# Patient Record
Sex: MALE | Race: WHITE | NOT HISPANIC OR LATINO | Employment: UNEMPLOYED | URBAN - METROPOLITAN AREA
[De-identification: names, ages, dates, MRNs, and addresses within clinical notes are randomized per-mention and may not be internally consistent; named-entity substitution may affect disease eponyms.]

---

## 2018-06-07 ENCOUNTER — OFFICE VISIT (OUTPATIENT)
Dept: FAMILY MEDICINE CLINIC | Facility: CLINIC | Age: 58
End: 2018-06-07
Payer: COMMERCIAL

## 2018-06-07 VITALS
DIASTOLIC BLOOD PRESSURE: 58 MMHG | WEIGHT: 150.6 LBS | HEART RATE: 59 BPM | SYSTOLIC BLOOD PRESSURE: 104 MMHG | TEMPERATURE: 96.7 F | BODY MASS INDEX: 22.31 KG/M2 | RESPIRATION RATE: 16 BRPM | HEIGHT: 69 IN | OXYGEN SATURATION: 98 %

## 2018-06-07 DIAGNOSIS — Z13.1 SCREENING FOR DIABETES MELLITUS: ICD-10-CM

## 2018-06-07 DIAGNOSIS — R53.83 FATIGUE, UNSPECIFIED TYPE: ICD-10-CM

## 2018-06-07 DIAGNOSIS — Z12.12 SCREENING FOR COLORECTAL CANCER: ICD-10-CM

## 2018-06-07 DIAGNOSIS — Z12.5 SCREENING FOR PROSTATE CANCER: ICD-10-CM

## 2018-06-07 DIAGNOSIS — Z00.00 HEALTHCARE MAINTENANCE: Primary | ICD-10-CM

## 2018-06-07 DIAGNOSIS — Z23 NEED FOR TDAP VACCINATION: ICD-10-CM

## 2018-06-07 DIAGNOSIS — Z12.11 SCREENING FOR COLORECTAL CANCER: ICD-10-CM

## 2018-06-07 PROCEDURE — 90715 TDAP VACCINE 7 YRS/> IM: CPT | Performed by: FAMILY MEDICINE

## 2018-06-07 PROCEDURE — 99396 PREV VISIT EST AGE 40-64: CPT | Performed by: FAMILY MEDICINE

## 2018-06-07 PROCEDURE — 90471 IMMUNIZATION ADMIN: CPT | Performed by: FAMILY MEDICINE

## 2018-06-07 RX ORDER — CHOLECALCIFEROL (VITAMIN D3) 50 MCG
TABLET ORAL
COMMUNITY

## 2018-06-07 RX ORDER — MULTIVITAMIN WITH IRON
TABLET ORAL
COMMUNITY
End: 2019-10-22 | Stop reason: ALTCHOICE

## 2018-06-08 NOTE — PROGRESS NOTES
Assessment/Plan     Healthy male exam       1    Anxiety:  Referred patient and provided contact information to [facility], [address] [facility]  2  Insomnia:  Patient states that he has taken melatonin the past without resolution of symptoms  Patient states he is taking a herbal supplement  He states that he ordered it over the phone and received a couple months supply  He states that it does not help  He showed me the pamphlet and it is basically a multivitamin with different herbs and supplements  Advised patient that the supplements have no oversight by the FDA and I am unable to say exactly what is contained within the pill and how accurate the supplements claims and levels of vitamins and herbs are actually contained within the pill  I also cannot tell the patient that there are no harmful substances contaminants within the pill as there is no FDA oversight  Patient declines taking any medication  Advised sleep hygiene including no activity 1 hr prior to bed, sleep in a dark on that area, reduce noise, can use white noise machine if patient feels that it helps  Urinate prior to going to bed  3  Patient has family h/o hemochromatosis and would like his blood iron levels checked  Will order iron panel, CBC with platelets  4   Patient Counselin  Nutrition: Stressed importance of moderation in sodium/caffeine intake, saturated fat and cholesterol, caloric balance, sufficient intake of fresh fruits, vegetables, fiber  2  Discussed the issue of estrogen replacement, calcium supplement, and the daily use of baby aspirin  3  Exercise: Stressed the importance of regular exercise  4  Substance Abuse: Discussed cessation/primary prevention of tobacco, alcohol, or other drug use; driving or other dangerous activities under the influence; availability of treatment for abuse    5  Sexuality: Discussed sexually transmitted diseases, partner selection, use of condoms, avoidance of unintended pregnancy  and contraceptive alternatives  6  Injury prevention: Discussed safety belts, safety helmets, smoke detector, smoking near bedding or upholstery  7  Dental health: Discussed importance of regular tooth brushing, flossing, and dental visits  8  Immunizations reviewed  Patient receive a tetanus booster  9  Routine screening blood work  His will order lipid panel, BMP, hemoglobin A1c to screen for diabetes, PSA for screening for prostate cancer  10  Discussed benefits of screening colonoscopy  Gastroenterology referral provided to patient with contact information  11  After hours service discussed with patient    3  Discussed the patient's BMI with him  BMI 22 24 The BMI is in the acceptable range  4  Follow up in one year  Sooner if patient has acute issue    Subjective     Tracey Veliz is a 66 y o  male and is here for a comprehensive physical exam  The patient reports that he is having trouble sleeping  Patient states that he can fall asleep but finds that he wakes up during the night multiple times  He does not have a reason for why he wakes up  Patient is unable to fall back asleep  Patient denies snoring, need to urinate during the night  Patient has tried melatonin the Passy feels that it does not work  Patient does take a mail order supplement from [employer] with multiple vitamins and herbs contained within it  Patient states that it does not help  Patient is extremely anxious  He does have trouble with anxiety and would like someone to talk to  Patient denies suicidal ideation or plan  Do you take any herbs or supplements that were not prescribed by a doctor? yes  Are you taking calcium supplements?  no  Are you taking aspirin daily? no        The following portions of the patient's history were reviewed and updated as appropriate: allergies, current medications, past family history, past medical history, past social history, past surgical history and problem list     Review of Systems  Do you have pain that bothers you in your daily life? no  A comprehensive review of systems was negative except for: As listed in HPI  Objective     /58   Pulse 59   Temp (!) 96 7 °F (35 9 °C)   Resp 16   Ht 5' 9" (1 753 m)   Wt 68 3 kg (150 lb 9 6 oz)   SpO2 98%   BMI 22 24 kg/m²     General Appearance:    Alert, cooperative, no distress, appears stated age   Head:    Normocephalic, without obvious abnormality, atraumatic   Eyes:    PERRL, conjunctiva/corneas clear, EOM's intact, fundi     benign, both eyes        Ears:    Normal TM's and external ear canals, both ears   Nose:   Nares normal, septum midline, mucosa normal, no drainage    or sinus tenderness   Throat:   Lips, mucosa, and tongue normal; teeth and gums normal   Neck:   Supple, symmetrical, trachea midline, no adenopathy;        thyroid:  No enlargement/tenderness/nodules; no carotid    bruit or JVD   Back:     Symmetric, no curvature, ROM normal, no CVA tenderness   Lungs:     Clear to auscultation bilaterally, respirations unlabored   Chest wall:    No tenderness or deformity   Heart:    Regular rate and rhythm, S1 and S2 normal, no murmur, rub   or gallop   Abdomen:     Soft, non-tender, bowel sounds active all four quadrants,     no masses, no organomegaly   Genitalia:    Normal male without lesion, discharge or tenderness   Rectal:    Deferred will order PSA   Extremities: Extremities normal, atraumatic, no cyanosis or edema   Pulses:   2+ and symmetric all extremities   Skin:   Skin color, texture, turgor normal, no rashes or lesions   Lymph nodes:   Cervical, supraclavicular, and axillary nodes normal   Neurologic:   CNII-XII intact  Normal strength, sensation and reflexes       throughout

## 2018-06-11 LAB
BUN SERPL-MCNC: 19 MG/DL (ref 6–24)
BUN/CREAT SERPL: 19 (ref 9–20)
CALCIUM SERPL-MCNC: 9.2 MG/DL (ref 8.7–10.2)
CHLORIDE SERPL-SCNC: 101 MMOL/L (ref 96–106)
CO2 SERPL-SCNC: 25 MMOL/L (ref 20–29)
CREAT SERPL-MCNC: 1.02 MG/DL (ref 0.76–1.27)
GLUCOSE SERPL-MCNC: 97 MG/DL (ref 65–99)
POTASSIUM SERPL-SCNC: 4.7 MMOL/L (ref 3.5–5.2)
SL AMB EGFR AFRICAN AMERICAN: 95 ML/MIN/1.73
SL AMB EGFR NON AFRICAN AMERICAN: 82 ML/MIN/1.73
SODIUM SERPL-SCNC: 142 MMOL/L (ref 134–144)

## 2018-06-12 LAB
CHOLEST SERPL-MCNC: 231 MG/DL (ref 100–199)
CHOLEST/HDLC SERPL: 3.7 RATIO (ref 0–5)
ERYTHROCYTE [DISTWIDTH] IN BLOOD BY AUTOMATED COUNT: 13 % (ref 12.3–15.4)
EST. AVERAGE GLUCOSE BLD GHB EST-MCNC: 108 MG/DL
HBA1C MFR BLD: 5.4 % (ref 4.8–5.6)
HCT VFR BLD AUTO: 47.3 % (ref 37.5–51)
HDLC SERPL-MCNC: 62 MG/DL
HGB BLD-MCNC: 15.7 G/DL (ref 13–17.7)
LDLC SERPL CALC-MCNC: 144 MG/DL (ref 0–99)
MAGNESIUM SERPL-MCNC: 2.1 MG/DL (ref 1.6–2.3)
MCH RBC QN AUTO: 29.8 PG (ref 26.6–33)
MCHC RBC AUTO-ENTMCNC: 33.2 G/DL (ref 31.5–35.7)
MCV RBC AUTO: 90 FL (ref 79–97)
PLATELET # BLD AUTO: 221 X10E3/UL (ref 150–379)
PSA FREE MFR SERPL: 25 %
PSA FREE SERPL-MCNC: 0.3 NG/ML
PSA SERPL-MCNC: 1.2 NG/ML (ref 0–4)
RBC # BLD AUTO: 5.27 X10E6/UL (ref 4.14–5.8)
SL AMB VLDL CHOLESTEROL CALC: 25 MG/DL (ref 5–40)
TRIGL SERPL-MCNC: 123 MG/DL (ref 0–149)
WBC # BLD AUTO: 5.6 X10E3/UL (ref 3.4–10.8)

## 2019-01-14 ENCOUNTER — OFFICE VISIT (OUTPATIENT)
Dept: PODIATRY | Facility: CLINIC | Age: 59
End: 2019-01-14
Payer: COMMERCIAL

## 2019-01-14 VITALS
BODY MASS INDEX: 22.22 KG/M2 | HEART RATE: 69 BPM | RESPIRATION RATE: 16 BRPM | DIASTOLIC BLOOD PRESSURE: 83 MMHG | HEIGHT: 69 IN | SYSTOLIC BLOOD PRESSURE: 125 MMHG | WEIGHT: 150 LBS

## 2019-01-14 DIAGNOSIS — Q66.52 CONGENITAL PES PLANUS OF LEFT FOOT: ICD-10-CM

## 2019-01-14 DIAGNOSIS — M21.969 ACQUIRED DEFORMITY OF FOOT, UNSPECIFIED LATERALITY: ICD-10-CM

## 2019-01-14 DIAGNOSIS — M77.42 METATARSALGIA OF BOTH FEET: Primary | ICD-10-CM

## 2019-01-14 DIAGNOSIS — M77.41 METATARSALGIA OF BOTH FEET: Primary | ICD-10-CM

## 2019-01-14 DIAGNOSIS — Q66.51 CONGENITAL PES PLANUS OF RIGHT FOOT: ICD-10-CM

## 2019-01-14 DIAGNOSIS — M79.672 PAIN IN BOTH FEET: ICD-10-CM

## 2019-01-14 DIAGNOSIS — M79.671 PAIN IN BOTH FEET: ICD-10-CM

## 2019-01-14 PROCEDURE — L3000 FT INSERT UCB BERKELEY SHELL: HCPCS | Performed by: PODIATRIST

## 2019-01-14 PROCEDURE — 99243 OFF/OP CNSLTJ NEW/EST LOW 30: CPT | Performed by: PODIATRIST

## 2019-01-14 RX ORDER — NICOTINE POLACRILEX 2 MG
GUM BUCCAL
COMMUNITY
End: 2019-10-22 | Stop reason: ALTCHOICE

## 2019-01-14 NOTE — PROGRESS NOTES
Assessment/Plan:  Metatarsalgia  Rule out neuroma  Pes planus bilateral   Arthrosis  Plan  Foot exam performed  Patient advised on condition  He declines injection therapy  He declines oral anti-inflammatory medicine  His feet have been casted for custom molded foot orthotics  He may need physical therapy    No problem-specific Assessment & Plan notes found for this encounter  There are no diagnoses linked to this encounter  Subjective:  Patient has a burning pain in the ball of the right foot  He believes it to be inflamed nerve  It has been there for years  No history of occult trauma    No past medical history on file  Past Surgical History:   Procedure Laterality Date    NO PAST SURGERIES         No Known Allergies      Current Outpatient Prescriptions:     Biotin 1 MG CAPS, Take by mouth, Disp: , Rfl:     cholecalciferol (VITAMIN D3) 1,000 units tablet, Take by mouth, Disp: , Rfl:     DiphenhydrAMINE HCl, Sleep, (RESTFULLY SLEEP PO), Take by mouth, Disp: , Rfl:     Magnesium 250 MG TABS, Take by mouth, Disp: , Rfl:     Omega-3 Fatty Acids (FISH OIL) 645 MG CAPS, Take by mouth, Disp: , Rfl:     Patient Active Problem List   Diagnosis    Anxiety    Tremor    Head trauma          Patient ID: May Gray is a 66 y o  male  HPI    The following portions of the patient's history were reviewed and updated as appropriate: allergies, current medications, past family history, past medical history, past social history, past surgical history and problem list     Review of Systems      Objective:  Patient's shoes and socks removed     Foot Exam    General  General Appearance: appears stated age and healthy   Orientation: alert and oriented to person, place, and time   Affect: appropriate   Gait: antalgic       Right Foot/Ankle     Inspection and Palpation  Ecchymosis: none  Tenderness: lesser metatarsophalangeal joints and metatarsals   Swelling: metatarsals   Arch: pes planus  Hammertoes: fifth toe  Hallux valgus: no  Hallux limitus: yes    Neurovascular  Dorsalis pedis: 2+  Posterior tibial: 3+  Superficial peroneal nerve sensation: diminished  Deep peroneal nerve sensation: diminished  Achilles reflex: 1+      Left Foot/Ankle      Inspection and Palpation  Ecchymosis: none  Tenderness: none   Swelling: none   Arch: pes planus  Hammertoes: fifth toe  Hallux valgus: no  Hallux limitus: yes    Neurovascular  Dorsalis pedis: 2+  Posterior tibial: 3+  Superficial peroneal nerve sensation: diminished  Deep peroneal nerve sensation: diminished  Achilles reflex: 1+        Physical Exam   Constitutional: He appears well-developed and well-nourished  Cardiovascular: Normal rate and regular rhythm  Pulses:       Dorsalis pedis pulses are 2+ on the right side, and 2+ on the left side  Posterior tibial pulses are 3+ on the right side, and 3+ on the left side  Neurological:   Reflex Scores:       Achilles reflexes are 1+ on the right side and 1+ on the left side  Positive Vicente sign 2nd right intermetatarsal space   Nursing note and vitals reviewed

## 2019-10-22 ENCOUNTER — OFFICE VISIT (OUTPATIENT)
Dept: FAMILY MEDICINE CLINIC | Facility: CLINIC | Age: 59
End: 2019-10-22
Payer: COMMERCIAL

## 2019-10-22 VITALS
TEMPERATURE: 97.5 F | WEIGHT: 142 LBS | HEART RATE: 70 BPM | SYSTOLIC BLOOD PRESSURE: 118 MMHG | HEIGHT: 68 IN | DIASTOLIC BLOOD PRESSURE: 80 MMHG | BODY MASS INDEX: 21.52 KG/M2 | RESPIRATION RATE: 12 BRPM | OXYGEN SATURATION: 94 %

## 2019-10-22 DIAGNOSIS — R53.83 FATIGUE, UNSPECIFIED TYPE: ICD-10-CM

## 2019-10-22 DIAGNOSIS — Z12.5 SCREENING FOR MALIGNANT NEOPLASM OF PROSTATE: ICD-10-CM

## 2019-10-22 DIAGNOSIS — Z11.59 NEED FOR HEPATITIS C SCREENING TEST: ICD-10-CM

## 2019-10-22 DIAGNOSIS — E78.2 MIXED HYPERLIPIDEMIA: ICD-10-CM

## 2019-10-22 DIAGNOSIS — Z12.11 SCREENING FOR MALIGNANT NEOPLASM OF COLON: ICD-10-CM

## 2019-10-22 DIAGNOSIS — Z23 NEED FOR IMMUNIZATION AGAINST INFLUENZA: ICD-10-CM

## 2019-10-22 DIAGNOSIS — Z83.3 FAMILY HISTORY OF DIABETES MELLITUS (DM): ICD-10-CM

## 2019-10-22 DIAGNOSIS — Z76.89 ENCOUNTER TO ESTABLISH CARE: ICD-10-CM

## 2019-10-22 DIAGNOSIS — E55.9 VITAMIN D DEFICIENCY: ICD-10-CM

## 2019-10-22 DIAGNOSIS — F41.9 ANXIETY: Primary | ICD-10-CM

## 2019-10-22 DIAGNOSIS — Z86.69 HISTORY OF EPILEPSY: ICD-10-CM

## 2019-10-22 DIAGNOSIS — G47.00 INSOMNIA, UNSPECIFIED TYPE: ICD-10-CM

## 2019-10-22 PROCEDURE — 90471 IMMUNIZATION ADMIN: CPT

## 2019-10-22 PROCEDURE — 99214 OFFICE O/P EST MOD 30 MIN: CPT | Performed by: NURSE PRACTITIONER

## 2019-10-22 PROCEDURE — 90682 RIV4 VACC RECOMBINANT DNA IM: CPT

## 2019-10-22 PROCEDURE — 36415 COLL VENOUS BLD VENIPUNCTURE: CPT | Performed by: NURSE PRACTITIONER

## 2019-10-22 NOTE — PATIENT INSTRUCTIONS
Continue with current medications  No change to current treatment plan  Labwork as ordered  Cologuard for colon cancer screening  Schedule appt with neurology as discussed  Follow up as discussed or return to office earlier if you have any new issues or concerns

## 2019-10-22 NOTE — PROGRESS NOTES
Assessment/Plan:  1  Anxiety  Stress management  Activities to divert attention when possible  Conscious breathing techniques as discussed  Coping mechanisms and strategies vary from person to person so try to utilize strategies that you think may work for you (such as meditation, music, etc  )  Consider counseling as discussed  - CBC and differential  - Comprehensive metabolic panel  - TSH, 3rd generation  2  History of epilepsy  - CBC and differential  - Comprehensive metabolic panel  - Ambulatory referral to Neurology; Future  3  Vitamin D deficiency  - CBC and differential  - Comprehensive metabolic panel  - Vitamin D 25 hydroxy  4  Mixed hyperlipidemia  - CBC and differential  - Comprehensive metabolic panel  - Lipid panel  5  Encounter to establish care  Health maintenance discussed  Diet, exercise, weight management, stress management, etc    All questions addressed, answered, and pt verbalized understanding  Anticipatory guidance  - CBC and differential  - Comprehensive metabolic panel  - Hemoglobin A1C  - TSH, 3rd generation  6  Screening for malignant neoplasm of prostate  - PSA Total, Diagnostic  7  Screening for malignant neoplasm of colon  - Cologuard; Future  8  Insomnia, unspecified type  Sleep hygiene reviewed  Consider increased dose of Melatonin  Sleep time tea/herbal tea  Will check labs  - CBC and differential  - Comprehensive metabolic panel  - TSH, 3rd generation  9  Fatigue, unspecified type  - CBC and differential  - Comprehensive metabolic panel  - TSH, 3rd generation  10  Family history of diabetes mellitus (DM)  - CBC and differential  - Comprehensive metabolic panel  - Hemoglobin A1C  11  Need for immunization against influenza  - influenza vaccine, 7922-7052, quadrivalent, recombinant, PF, 0 5 mL, for patients 18 yr+ (FLUBLOK)  12   Need for hepatitis C screening test  - Hepatitis C antibody    Depression Screening Follow-up Plan: Patient's depression screening was positive with a PHQ-2 score of 2  Their PHQ-9 score was 12  Patient assessed for underlying major depression  They have no active suicidal ideations  Brief counseling provided and recommend additional follow-up/re-evaluation next office visit  BMI Counseling: Body mass index is 21 75 kg/m²  The BMI wnl  Counseled on balanced diet and regular exercise    Subjective:      Patient ID: Ernestine Mckeon is a 64 y o  male who presents to establish care    Here to establish care  PMH reviewed  Only med vitamin D  Used to take meds for chol, used to take fish oil   History of epilepsy since childhood  Had surgery and was on seizure meds from [id number]  No meds since   Seizure free for about 10 years  Did not follow up with neurology  Unsure if he should have "brain scan"  PSH ; only surgery for epilepsy  SH: non smoker, no etoh, used to work in garage  Not currently working, few years  Lives alone  FH: mother- , MI, father- DM, father and brother had hemachromatocytosis  Has issues with anxiety and stress  Has never been on meds for anxiety  Having issues with sleep  Sleeps for about 3 hours and then usually awake and cannot fall asleep  Has tried melatonin, 3 mg  Helped a little initially but then stopped helping  The following portions of the patient's history were reviewed and updated as appropriate: allergies, current medications, past family history, past medical history, past social history, past surgical history and problem list     Review of Systems   Constitutional: Negative for activity change and unexpected weight change  HENT: Negative for congestion and sore throat  Eyes: Negative for visual disturbance  Respiratory: Negative for cough, chest tightness and shortness of breath  Cardiovascular: Negative for chest pain, palpitations and leg swelling  Gastrointestinal: Negative for abdominal pain, diarrhea, nausea and vomiting     Endocrine: Negative for cold intolerance, heat intolerance, polydipsia, polyphagia and polyuria  Genitourinary: Negative for dysuria, frequency and urgency  Musculoskeletal: Negative for arthralgias and myalgias  Skin: Negative for color change and pallor  Allergic/Immunologic: Negative for immunocompromised state  Neurological: Positive for seizures (history but no recent seizure)  Negative for dizziness and headaches  Hematological: Negative for adenopathy  Does not bruise/bleed easily  Psychiatric/Behavioral: Positive for sleep disturbance  Negative for self-injury and suicidal ideas  The patient is nervous/anxious  All other systems reviewed and are negative  Objective:      /80 (BP Location: Right arm, Patient Position: Sitting, Cuff Size: Standard)   Pulse 70   Temp 97 5 °F (36 4 °C) (Temporal)   Resp 12   Ht 5' 7 75" (1 721 m)   Wt 64 4 kg (142 lb)   SpO2 94%   BMI 21 75 kg/m²          Physical Exam   Constitutional: He is oriented to person, place, and time  He appears well-developed and well-nourished  No distress  Eyes: Pupils are equal, round, and reactive to light  EOM are normal    Neck: Normal range of motion  Cardiovascular: Normal rate and regular rhythm  No JVD  No audible carotid bruit  No peripheral edema  Pulmonary/Chest: Effort normal and breath sounds normal  No respiratory distress  He has no wheezes  Abdominal: Soft  Bowel sounds are normal    Musculoskeletal: He exhibits no edema  Neurological: He is alert and oriented to person, place, and time  No cranial nerve deficit  Coordination normal    Skin: Skin is warm and dry  No rash noted  He is not diaphoretic  No erythema  No pallor  Psychiatric: He has a normal mood and affect  His behavior is normal  Judgment and thought content normal    Well groomed  Good eye contact  Pleasant  Cooperative  Converses freely and appropriately   Vitals reviewed

## 2019-10-24 LAB
25(OH)D3+25(OH)D2 SERPL-MCNC: 47 NG/ML (ref 30–100)
ALBUMIN SERPL-MCNC: 4.6 G/DL (ref 3.5–5.5)
ALBUMIN/GLOB SERPL: 1.8 {RATIO} (ref 1.2–2.2)
ALP SERPL-CCNC: 78 IU/L (ref 39–117)
ALT SERPL-CCNC: 16 IU/L (ref 0–44)
AST SERPL-CCNC: 19 IU/L (ref 0–40)
BASOPHILS # BLD AUTO: 0.1 X10E3/UL (ref 0–0.2)
BASOPHILS NFR BLD AUTO: 2 %
BILIRUB SERPL-MCNC: 0.2 MG/DL (ref 0–1.2)
BUN SERPL-MCNC: 17 MG/DL (ref 6–24)
BUN/CREAT SERPL: 20 (ref 9–20)
CALCIUM SERPL-MCNC: 9.7 MG/DL (ref 8.7–10.2)
CHLORIDE SERPL-SCNC: 98 MMOL/L (ref 96–106)
CHOLEST SERPL-MCNC: 211 MG/DL (ref 100–199)
CHOLEST/HDLC SERPL: 3.1 RATIO (ref 0–5)
CO2 SERPL-SCNC: 26 MMOL/L (ref 20–29)
CREAT SERPL-MCNC: 0.87 MG/DL (ref 0.76–1.27)
EOSINOPHIL # BLD AUTO: 0.3 X10E3/UL (ref 0–0.4)
EOSINOPHIL NFR BLD AUTO: 5 %
ERYTHROCYTE [DISTWIDTH] IN BLOOD BY AUTOMATED COUNT: 13.3 % (ref 12.3–15.4)
EST. AVERAGE GLUCOSE BLD GHB EST-MCNC: 108 MG/DL
GLOBULIN SER-MCNC: 2.6 G/DL (ref 1.5–4.5)
GLUCOSE SERPL-MCNC: 97 MG/DL (ref 65–99)
HBA1C MFR BLD: 5.4 % (ref 4.8–5.6)
HCT VFR BLD AUTO: 46 % (ref 37.5–51)
HCV AB S/CO SERPL IA: <0.1 S/CO RATIO (ref 0–0.9)
HDLC SERPL-MCNC: 68 MG/DL
HGB BLD-MCNC: 15.6 G/DL (ref 13–17.7)
IMM GRANULOCYTES # BLD: 0 X10E3/UL (ref 0–0.1)
IMM GRANULOCYTES NFR BLD: 0 %
LDLC SERPL CALC-MCNC: 116 MG/DL (ref 0–99)
LYMPHOCYTES # BLD AUTO: 2.3 X10E3/UL (ref 0.7–3.1)
LYMPHOCYTES NFR BLD AUTO: 34 %
MCH RBC QN AUTO: 30.4 PG (ref 26.6–33)
MCHC RBC AUTO-ENTMCNC: 33.9 G/DL (ref 31.5–35.7)
MCV RBC AUTO: 90 FL (ref 79–97)
MONOCYTES # BLD AUTO: 0.4 X10E3/UL (ref 0.1–0.9)
MONOCYTES NFR BLD AUTO: 6 %
NEUTROPHILS # BLD AUTO: 3.6 X10E3/UL (ref 1.4–7)
NEUTROPHILS NFR BLD AUTO: 53 %
PLATELET # BLD AUTO: 222 X10E3/UL (ref 150–450)
POTASSIUM SERPL-SCNC: 4.3 MMOL/L (ref 3.5–5.2)
PROT SERPL-MCNC: 7.2 G/DL (ref 6–8.5)
PSA SERPL-MCNC: 0.9 NG/ML (ref 0–4)
RBC # BLD AUTO: 5.14 X10E6/UL (ref 4.14–5.8)
SL AMB EGFR AFRICAN AMERICAN: 111 ML/MIN/1.73
SL AMB EGFR NON AFRICAN AMERICAN: 96 ML/MIN/1.73
SL AMB VLDL CHOLESTEROL CALC: 27 MG/DL (ref 5–40)
SODIUM SERPL-SCNC: 143 MMOL/L (ref 134–144)
TRIGL SERPL-MCNC: 136 MG/DL (ref 0–149)
TSH SERPL DL<=0.005 MIU/L-ACNC: 1.76 UIU/ML (ref 0.45–4.5)
WBC # BLD AUTO: 6.8 X10E3/UL (ref 3.4–10.8)

## 2019-10-28 ENCOUNTER — OFFICE VISIT (OUTPATIENT)
Dept: FAMILY MEDICINE CLINIC | Facility: CLINIC | Age: 59
End: 2019-10-28
Payer: COMMERCIAL

## 2019-10-28 VITALS
BODY MASS INDEX: 21.67 KG/M2 | RESPIRATION RATE: 12 BRPM | TEMPERATURE: 97.3 F | DIASTOLIC BLOOD PRESSURE: 72 MMHG | HEART RATE: 60 BPM | HEIGHT: 68 IN | WEIGHT: 143 LBS | SYSTOLIC BLOOD PRESSURE: 110 MMHG

## 2019-10-28 DIAGNOSIS — G47.09 OTHER INSOMNIA: ICD-10-CM

## 2019-10-28 DIAGNOSIS — Z86.69 HISTORY OF EPILEPSY: ICD-10-CM

## 2019-10-28 DIAGNOSIS — E78.2 MIXED HYPERLIPIDEMIA: ICD-10-CM

## 2019-10-28 DIAGNOSIS — E55.9 VITAMIN D DEFICIENCY: ICD-10-CM

## 2019-10-28 DIAGNOSIS — F41.9 ANXIETY: Primary | ICD-10-CM

## 2019-10-28 PROCEDURE — 99214 OFFICE O/P EST MOD 30 MIN: CPT | Performed by: NURSE PRACTITIONER

## 2019-10-28 PROCEDURE — 3008F BODY MASS INDEX DOCD: CPT | Performed by: NURSE PRACTITIONER

## 2019-10-28 NOTE — PROGRESS NOTES
Assessment/Plan:  1  Anxiety  Stress management  Activities to divert attention when possible  Conscious breathing techniques as discussed  Coping mechanisms and strategies vary from person to person so try to utilize strategies that you think may work for you (such as meditation, music, etc  )  Consider or continue counseling as discussed  Information for family guidance given  2  History of epilepsy  Encouraged pt to schedule appt with neuro as previously discussed  3  Mixed hyperlipidemia  Heart healthy diet  Recommend fish oil 1000mg dialy  4  Vitamin D deficiency  Continue otc supplementation  5  Other insomnia  Sleep hygiene reviewed  Discussed things such as herbal teas, melatonin  Recommended ritual prior to bedtime including no stimulation for at least one hour, hot shower/bath, no TV or computer, silence phone, etc        Subjective:      Patient ID: Don Phan is a 64 y o  male who presents for follow up and bloodwork review    Here to follow up on chronic issues and review labs  Has ongoing issues with insomnia and anxiety  Does not want to take meds  Willing to consider counseling  Has tried melatonin for sleep with no change  Able to fall asleep but wakes after about 3 hours  No panic attacks  Denies suicidal ideation  Contacted insurance for cologuard but told not covered  Does not want to have colonoscopy at this time  States is very difficult for him to make arrangements for something like that  No abd pain, n/v/d  No recent illness  Feels good otherwise  No other issues or concerns  The following portions of the patient's history were reviewed and updated as appropriate: allergies, current medications, past family history, past medical history, past social history, past surgical history and problem list     Review of Systems   Constitutional: Negative for activity change, appetite change and unexpected weight change  HENT: Negative for congestion      Respiratory: Negative for chest tightness and shortness of breath  Cardiovascular: Negative for chest pain, palpitations and leg swelling  Gastrointestinal: Negative for abdominal pain, constipation, diarrhea, nausea and vomiting  Endocrine: Negative for cold intolerance, heat intolerance, polydipsia, polyphagia and polyuria  Skin: Negative for color change, pallor, rash and wound  Neurological: Negative for dizziness, seizures (history of epilepsy  no recent seizures  last seizure over 10 years) and headaches  Psychiatric/Behavioral: Positive for sleep disturbance  Negative for self-injury and suicidal ideas  The patient is nervous/anxious            Objective:    Recent Results (from the past 672 hour(s))   CBC and differential    Collection Time: 10/22/19  4:16 PM   Result Value Ref Range    White Blood Cell Count 6 8 3 4 - 10 8 x10E3/uL    Red Blood Cell Count 5 14 4 14 - 5 80 x10E6/uL    Hemoglobin 15 6 13 0 - 17 7 g/dL    HCT 46 0 37 5 - 51 0 %    MCV 90 79 - 97 fL    MCH 30 4 26 6 - 33 0 pg    MCHC 33 9 31 5 - 35 7 g/dL    RDW 13 3 12 3 - 15 4 %    Platelet Count 390 419 - 450 x10E3/uL    Neutrophils 53 Not Estab  %    Lymphocytes 34 Not Estab  %    Monocytes 6 Not Estab  %    Eosinophils 5 Not Estab  %    Basophils PCT 2 Not Estab  %    Neutrophils (Absolute) 3 6 1 4 - 7 0 x10E3/uL    Lymphocytes (Absolute) 2 3 0 7 - 3 1 x10E3/uL    Monocytes (Absolute) 0 4 0 1 - 0 9 x10E3/uL    Eosinophils (Absolute) 0 3 0 0 - 0 4 x10E3/uL    Basophils ABS 0 1 0 0 - 0 2 x10E3/uL    Immature Granulocytes 0 Not Estab  %    Immature Granulocytes (Absolute) 0 0 0 0 - 0 1 x10E3/uL   Comprehensive metabolic panel    Collection Time: 10/22/19  4:16 PM   Result Value Ref Range    Glucose, Random 97 65 - 99 mg/dL    BUN 17 6 - 24 mg/dL    Creatinine 0 87 0 76 - 1 27 mg/dL    eGFR Non  96 >59 mL/min/1 73    eGFR  111 >59 mL/min/1 73    SL AMB BUN/CREATININE RATIO 20 9 - 20    Sodium 143 134 - 144 mmol/L Potassium 4 3 3 5 - 5 2 mmol/L    Chloride 98 96 - 106 mmol/L    CO2 26 20 - 29 mmol/L    CALCIUM 9 7 8 7 - 10 2 mg/dL    Protein, Total 7 2 6 0 - 8 5 g/dL    Albumin 4 6 3 5 - 5 5 g/dL    Globulin, Total 2 6 1 5 - 4 5 g/dL    Albumin/Globulin Ratio 1 8 1 2 - 2 2    TOTAL BILIRUBIN 0 2 0 0 - 1 2 mg/dL    Alk Phos Isoenzymes 78 39 - 117 IU/L    AST 19 0 - 40 IU/L    ALT 16 0 - 44 IU/L   Hemoglobin A1C    Collection Time: 10/22/19  4:16 PM   Result Value Ref Range    Hemoglobin A1C 5 4 4 8 - 5 6 %    Estimated Average Glucose 108 mg/dL   Lipid panel    Collection Time: 10/22/19  4:16 PM   Result Value Ref Range    Cholesterol, Total 211 (H) 100 - 199 mg/dL    Triglycerides 136 0 - 149 mg/dL    HDL 68 >39 mg/dL    VLDL Cholesterol Calculated 27 5 - 40 mg/dL    LDL Direct 116 (H) 0 - 99 mg/dL    T  Chol/HDL Ratio 3 1 0 0 - 5 0 ratio   TSH, 3rd generation    Collection Time: 10/22/19  4:16 PM   Result Value Ref Range    TSH 1 760 0 450 - 4 500 uIU/mL   Vitamin D 25 hydroxy    Collection Time: 10/22/19  4:16 PM   Result Value Ref Range    25-HYDROXY VIT D 47 0 30 0 - 100 0 ng/mL   PSA Total, Diagnostic    Collection Time: 10/22/19  4:16 PM   Result Value Ref Range    Prostate Specific Antigen Total 0 9 0 0 - 4 0 ng/mL   Hepatitis C antibody    Collection Time: 10/22/19  4:16 PM   Result Value Ref Range    HEP C AB <0 1 0 0 - 0 9 s/co ratio       /72 (BP Location: Right arm, Patient Position: Sitting, Cuff Size: Adult)   Pulse 60   Temp (!) 97 3 °F (36 3 °C) (Temporal)   Resp 12   Ht 5' 7 75" (1 721 m)   Wt 64 9 kg (143 lb)   BMI 21 90 kg/m²          Physical Exam   Constitutional: He is oriented to person, place, and time  He appears well-developed and well-nourished  No distress  Cardiovascular: Normal rate and regular rhythm  No JVD  No audible carotid bruit  No peripheral edema  Pulmonary/Chest: Effort normal and breath sounds normal  No respiratory distress     Neurological: He is alert and oriented to person, place, and time  No cranial nerve deficit  Coordination normal    Skin: Skin is warm and dry  No rash noted  He is not diaphoretic  No erythema  Psychiatric: He has a normal mood and affect  His behavior is normal  Judgment and thought content normal    Vitals reviewed

## 2019-10-28 NOTE — PATIENT INSTRUCTIONS
Heart healthy diet  Recommend over the counter Fish oil 1000mg daily  Continue vitamin D supplementation  Counseling has been recommended  Please contact [facility] ([facility], [address] [facility] [phone]) to schedule appointment

## 2019-11-19 ENCOUNTER — TELEPHONE (OUTPATIENT)
Dept: NEUROLOGY | Facility: CLINIC | Age: 59
End: 2019-11-19

## 2019-11-19 NOTE — TELEPHONE ENCOUNTER
Patient came in today with an ambulatory referral from [facility] Mccrary due to a history of Epilepsy  Patient had epilepsy surgery 25 years ago in [place] but hasn't had a f/up with a neurologist in 20+ years  He stated that he used to see Dr Mathieu Gregorio when he used to practice in [facility]  He now practices at [facility] ([phone]) and asked his office if they know where he used to practice in [place]  She stated that she didn't know and that the Dr  Was out of the office for the next couple weeks  She sent him a message asking for his prior office info so we can try to get records or if he knows where he would have sent the patient in [facility] for surgery  I provided my direct line and will hopefully hear back from them soon

## 2020-03-11 ENCOUNTER — OFFICE VISIT (OUTPATIENT)
Dept: PULMONOLOGY | Facility: MEDICAL CENTER | Age: 60
End: 2020-03-11
Payer: COMMERCIAL

## 2020-03-11 VITALS
WEIGHT: 146 LBS | OXYGEN SATURATION: 98 % | RESPIRATION RATE: 12 BRPM | DIASTOLIC BLOOD PRESSURE: 78 MMHG | BODY MASS INDEX: 22.91 KG/M2 | HEIGHT: 67 IN | SYSTOLIC BLOOD PRESSURE: 140 MMHG | HEART RATE: 70 BPM | TEMPERATURE: 98.4 F

## 2020-03-11 DIAGNOSIS — G47.09 OTHER INSOMNIA: Primary | ICD-10-CM

## 2020-03-11 DIAGNOSIS — G40.802 OTHER EPILEPSY WITHOUT STATUS EPILEPTICUS, NOT INTRACTABLE (HCC): ICD-10-CM

## 2020-03-11 DIAGNOSIS — F41.9 ANXIETY: ICD-10-CM

## 2020-03-11 PROCEDURE — 3008F BODY MASS INDEX DOCD: CPT | Performed by: NURSE PRACTITIONER

## 2020-03-11 PROCEDURE — 99213 OFFICE O/P EST LOW 20 MIN: CPT | Performed by: NURSE PRACTITIONER

## 2020-03-11 PROCEDURE — 1036F TOBACCO NON-USER: CPT | Performed by: NURSE PRACTITIONER

## 2020-03-11 RX ORDER — OMEGA-3-ACID ETHYL ESTERS 1 G/1
2 CAPSULE, LIQUID FILLED ORAL 2 TIMES DAILY
COMMUNITY
End: 2020-04-27 | Stop reason: SDUPTHER

## 2020-03-11 NOTE — ASSESSMENT & PLAN NOTE
Patient appears to have insomnia  He does not have any symptoms of obstructive sleep apnea  This 53-year-old male has a body mass index of 22 and Mallampati score of 2  Mcallen Sleepiness Scale is 0 neck circumference is 14 in  It is likely that his chief diagnosis is anxiety  While he is hesitant to take any medication for anxiety, I believe that seeing a psychiatrist would be of value  I do not recommend any sleep study at this time      I will refer him to neuro psychologist as he has a history of epilepsy as well

## 2020-03-11 NOTE — PATIENT INSTRUCTIONS
Insomnia   WHAT YOU NEED TO KNOW:   Insomnia is a condition that makes it hard to fall or stay asleep  Lack of sleep can lead to attention or memory problems during the day  You may also be ivan, depressed, clumsy, or have headaches  DISCHARGE INSTRUCTIONS:   Contact your healthcare provider if:   · Your symptoms do not get better, or they get worse  · You begin to use drugs or alcohol to fall asleep  · You have questions or concerns about your condition or care  Medicines:   · Medicines  may help you sleep more regularly or help you feel less anxious  · Take your medicine as directed  Contact your healthcare provider if you think your medicine is not helping or if you have side effects  Tell him or her if you are allergic to any medicine  Keep a list of the medicines, vitamins, and herbs you take  Include the amounts, and when and why you take them  Bring the list or the pill bottles to follow-up visits  Carry your medicine list with you in case of an emergency  What you can do to improve your sleep:   · Create a sleep schedule  This will help you form a sleep routine  Keep a record of your sleep patterns, and any sleeping problems you have  Bring the record to follow-up visits with healthcare providers  · Do not take naps  Naps could make it hard for you to fall asleep at bedtime  · Keep your bedroom cool, quiet, and dark  Turn on white noise, such as a fan, to help you relax  Do not use your bed for any activity that will keep you awake  Do not read, exercise, eat, or watch TV in your bedroom  · Get up if you do not fall asleep within 20 minutes  Move to another room and do something relaxing until you become sleepy  · Limit caffeine, alcohol, and food to earlier in the day  Only drink caffeine in the morning  Do not drink alcohol within 6 hours of bedtime  Do not eat a heavy meal right before you go to bed  · Exercise regularly  Daily exercise may help you sleep better   Do not exercise within 4 hours of bedtime  Follow up with your healthcare provider as directed: Your healthcare provider may refer you for cognitive behavioral therapy  A behavioral therapist may help you find ways to relax, decrease stress, and improve sleep  Write down your questions so you remember to ask them during your visits  © 2017 [employer] [employer] Information is for End User's use only and may not be sold, redistributed or otherwise used for commercial purposes  All illustrations and images included in CareNotes® are the copyrighted property of [employer] , [employer]  or Aldo Medley  The above information is an  only  It is not intended as medical advice for individual conditions or treatments  Talk to your doctor, nurse or pharmacist before following any medical regimen to see if it is safe and effective for you

## 2020-03-11 NOTE — ASSESSMENT & PLAN NOTE
Patient does report having anxiety  He believes this is the cause of his insomnia  Sky Silva does fall asleep and sleeps for about 3 or 4 hours  However he is unable then to fall back to sleep  Options some when include a sleep specialist who accepts his present form of insurance  Eventually I may be able to refer him to Dr Beryle Auerbach who is part of the team for Sleep Medicine at [facility]  Currently he does not except [place] Medicaid

## 2020-03-11 NOTE — PROGRESS NOTES
Assessment/Plan:       Problem List Items Addressed This Visit        Other    Anxiety     Patient does report having anxiety  He believes this is the cause of his insomnia  Katharine Davison does fall asleep and sleeps for about 3 or 4 hours  However he is unable then to fall back to sleep  Options some when include a sleep specialist who accepts his present form of insurance  Eventually I may be able to refer him to Dr Raymundo Armstrong who is part of the team for Sleep Medicine at [facility]  Currently he does not except [place] Medicaid  Other insomnia - Primary     Patient appears to have insomnia  He does not have any symptoms of obstructive sleep apnea  This 61-year-old male has a body mass index of 22 and Mallampati score of 2  [place] Sleepiness Scale is 0 neck circumference is 14 in  It is likely that his chief diagnosis is anxiety  While he is hesitant to take any medication for anxiety, I believe that seeing a psychiatrist would be of value  I do not recommend any sleep study at this time  I will refer him to neuro psychologist as he has a history of epilepsy as well            Other Visit Diagnoses     Other epilepsy without status epilepticus, not intractable ([facility] [address] )        Relevant Orders    Ambulatory referral to Psychiatry            Return if symptoms worsen or fail to improve  All questions are answered to the patient's satisfaction and understanding  He verbalizes understanding  He is encouraged to call with any further questions or concerns  Portions of the record may have been created with voice recognition software  Occasional wrong word or "sound a like" substitutions may have occurred due to the inherent limitations of voice recognition software  Read the chart carefully and recognize, using context, where substitutions have occurred  a  HPI:  Katharine Davison is a 61-year-old male who believes he suffers from anxiety    Patient's chief complaint is lack of sleep  He has had this for 10 years  He sleeps approximately 4 hours per night  However, often he says that he stays awake throughout the night for stress related symptoms  This patient has a history of epilepsy  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  He was at the [facility] he believes he went to [address] for which he had PET-CT  I do not have any current records to rely on  Patient's history of seizures began at age 10  He had subsequent seizures then in 7th grade and then in 9th grade he was on anti seizure medication from [id number] [facility] until 2010 he weaned himself off of anti seizure medication  He was on Dilantin and Tegretol  He felt overmedicated and decided to wean himself off  He has had no seizure activity since that time  This chief complaint is insomnia  He does have daytime hypersomnolence   Electronically Signed by John Espraza, KARIE    ______________________________________________________________________    Chief Complaint:   Chief Complaint   Patient presents with    Establish Care     Referred bu Nimco Eaton    Sleeping Problem     Cannot fall asleep; trouble staying asleep,     Daytime sleepiness        Patient ID: Maida Jarquin is a 64 y o  y o  male has no past medical history on file  3/11/2020  Patient presents today for initial visit  Fatigue   This is a chronic problem  The current episode started more than 1 year ago  The problem occurs daily  The problem has been waxing and waning  Associated symptoms include fatigue  Nothing aggravates the symptoms  He has tried rest and sleep for the symptoms  The treatment provided mild relief  Occupational/Exposure history:  Pets/Enviroment:  Travel history:  Review of Systems   Constitutional: Positive for fatigue  HENT: Negative  Eyes: Negative  Respiratory: Negative  Cardiovascular: Negative  Gastrointestinal: Negative  Endocrine: Negative      Genitourinary: Negative  Musculoskeletal: Negative  Skin: Negative  Allergic/Immunologic: Negative  Neurological: Negative  Hematological: Negative  Psychiatric/Behavioral: Negative  Social history: He reports that he has never smoked  He has never used smokeless tobacco  He reports that he does not drink alcohol or use drugs  Past surgical history:   Past Surgical History:   Procedure Laterality Date    NO PAST SURGERIES       Family history:   Family History   Problem Relation Age of Onset    Diabetes type II Father         syage 2 chronic kidney disease    Cancer Maternal Grandmother     Substance Abuse Neg Hx     Mental illness Neg Hx        Immunization History   Administered Date(s) Administered    Influenza Quadrivalent Preservative Free 3 years and older IM 12/10/2015    Influenza, recombinant, quadrivalent,injectable, preservative free 10/22/2019    Tdap 06/07/2018     Current Outpatient Medications   Medication Sig Dispense Refill    Cholecalciferol (VITAMIN D) 2000 units tablet Take by mouth       omega-3-acid ethyl esters (LOVAZA) 1 g capsule Take 2 g by mouth 2 (two) times a day       No current facility-administered medications for this visit  Allergies: Patient has no known allergies  Objective:  Vitals:    03/11/20 1527   BP: 140/78   BP Location: Left arm   Patient Position: Sitting   Cuff Size: Standard   Pulse: 70   Resp: 12   Temp: 98 4 °F (36 9 °C)   TempSrc: Tympanic   SpO2: 98%   Weight: 66 2 kg (146 lb)   Height: 5' 7" (1 702 m)   Oxygen Therapy  SpO2: 98 %    Wt Readings from Last 3 Encounters:   03/11/20 66 2 kg (146 lb)   10/28/19 64 9 kg (143 lb)   10/22/19 64 4 kg (142 lb)     Body mass index is 22 87 kg/m²  Physical Exam   Constitutional: He is oriented to person, place, and time  He appears well-developed and well-nourished  BMI 22   HENT:   Head: Normocephalic and atraumatic  Mallampati 2   Eyes: Pupils are equal, round, and reactive to light  EOM are normal    Neck: Normal range of motion  Neck supple  Cardiovascular: Normal rate and regular rhythm  Pulmonary/Chest: Effort normal    Abdominal: Soft  Musculoskeletal: Normal range of motion  Neurological: He is alert and oriented to person, place, and time  Skin: Skin is warm and dry  Capillary refill takes less than 2 seconds  Psychiatric: He has a normal mood and affect   His behavior is normal        Lab Review:   Office Visit on 10/22/2019   Component Date Value    White Blood Cell Count 10/22/2019 6 8     Red Blood Cell Count 10/22/2019 5 14     Hemoglobin 10/22/2019 15 6     HCT 10/22/2019 46 0     MCV 10/22/2019 90     MCH 10/22/2019 30 4     MCHC 10/22/2019 33 9     RDW 10/22/2019 13 3     Platelet Count 53/57/1911 222     Neutrophils 10/22/2019 53     Lymphocytes 10/22/2019 34     Monocytes 10/22/2019 6     Eosinophils 10/22/2019 5     Basophils PCT 10/22/2019 2     Neutrophils (Absolute) 10/22/2019 3 6     Lymphocytes (Absolute) 10/22/2019 2 3     Monocytes (Absolute) 10/22/2019 0 4     Eosinophils (Absolute) 10/22/2019 0 3     Basophils ABS 10/22/2019 0 1     Immature Granulocytes 10/22/2019 0     Immature Granulocytes (A* 10/22/2019 0 0     Glucose, Random 10/22/2019 97     BUN 10/22/2019 17     Creatinine 10/22/2019 0 87     eGFR Non  10/22/2019 96     eGFR  10/22/2019 111     SL AMB BUN/CREATININE RA* 10/22/2019 20     Sodium 10/22/2019 143     Potassium 10/22/2019 4 3     Chloride 10/22/2019 98     CO2 10/22/2019 26     CALCIUM 10/22/2019 9 7     Protein, Total 10/22/2019 7 2     Albumin 10/22/2019 4 6     Globulin, Total 10/22/2019 2 6     Albumin/Globulin Ratio 10/22/2019 1 8     TOTAL BILIRUBIN 10/22/2019 0 2     Alk Phos Isoenzymes 10/22/2019 78     AST 10/22/2019 19     ALT 10/22/2019 16     Hemoglobin A1C 10/22/2019 5 4     Estimated Average Glucose 10/22/2019 108     Cholesterol, Total 10/22/2019 211*    Triglycerides 10/22/2019 136     HDL 10/22/2019 68     VLDL Cholesterol Calcula* 10/22/2019 27     LDL Direct 10/22/2019 116*    T  Chol/HDL Ratio 10/22/2019 3 1     TSH 10/22/2019 1 760     25-HYDROXY VIT D 10/22/2019 47 0     Prostate Specific Antige* 10/22/2019 0 9     HEP C AB 10/22/2019 <0 1          Diagnostics:  I have personally reviewed pertinent reports  Office Spirometry Results:     ESS: Total score: 0  No results found

## 2020-04-27 ENCOUNTER — TELEMEDICINE (OUTPATIENT)
Dept: FAMILY MEDICINE CLINIC | Facility: CLINIC | Age: 60
End: 2020-04-27
Payer: COMMERCIAL

## 2020-04-27 DIAGNOSIS — F32.A DEPRESSION, UNSPECIFIED DEPRESSION TYPE: ICD-10-CM

## 2020-04-27 DIAGNOSIS — E78.2 MIXED HYPERLIPIDEMIA: ICD-10-CM

## 2020-04-27 DIAGNOSIS — E55.9 VITAMIN D DEFICIENCY: ICD-10-CM

## 2020-04-27 DIAGNOSIS — F41.9 ANXIETY: Primary | ICD-10-CM

## 2020-04-27 PROCEDURE — 99213 OFFICE O/P EST LOW 20 MIN: CPT | Performed by: NURSE PRACTITIONER

## 2020-04-27 RX ORDER — ASCORBIC ACID 500 MG
500 TABLET ORAL DAILY
COMMUNITY

## 2020-06-30 ENCOUNTER — OFFICE VISIT (OUTPATIENT)
Dept: FAMILY MEDICINE CLINIC | Facility: CLINIC | Age: 60
End: 2020-06-30
Payer: COMMERCIAL

## 2020-06-30 VITALS
TEMPERATURE: 98.1 F | OXYGEN SATURATION: 95 % | SYSTOLIC BLOOD PRESSURE: 118 MMHG | BODY MASS INDEX: 22.13 KG/M2 | WEIGHT: 141 LBS | HEIGHT: 67 IN | HEART RATE: 65 BPM | DIASTOLIC BLOOD PRESSURE: 70 MMHG

## 2020-06-30 DIAGNOSIS — E78.2 MIXED HYPERLIPIDEMIA: ICD-10-CM

## 2020-06-30 DIAGNOSIS — E55.9 VITAMIN D DEFICIENCY: ICD-10-CM

## 2020-06-30 DIAGNOSIS — Z00.00 ANNUAL PHYSICAL EXAM: Primary | ICD-10-CM

## 2020-06-30 PROCEDURE — 36415 COLL VENOUS BLD VENIPUNCTURE: CPT | Performed by: NURSE PRACTITIONER

## 2020-06-30 PROCEDURE — 99396 PREV VISIT EST AGE 40-64: CPT | Performed by: NURSE PRACTITIONER

## 2020-06-30 PROCEDURE — 3008F BODY MASS INDEX DOCD: CPT | Performed by: NURSE PRACTITIONER

## 2020-07-01 LAB
25(OH)D3+25(OH)D2 SERPL-MCNC: 45 NG/ML (ref 30–100)
ALBUMIN SERPL-MCNC: 4.7 G/DL (ref 3.8–4.9)
ALBUMIN/GLOB SERPL: 1.7 {RATIO} (ref 1.2–2.2)
ALP SERPL-CCNC: 69 IU/L (ref 39–117)
ALT SERPL-CCNC: 16 IU/L (ref 0–44)
AST SERPL-CCNC: 17 IU/L (ref 0–40)
BASOPHILS # BLD AUTO: 0.1 X10E3/UL (ref 0–0.2)
BASOPHILS NFR BLD AUTO: 2 %
BILIRUB SERPL-MCNC: 0.3 MG/DL (ref 0–1.2)
BUN SERPL-MCNC: 16 MG/DL (ref 6–24)
BUN/CREAT SERPL: 18 (ref 9–20)
CALCIUM SERPL-MCNC: 9.5 MG/DL (ref 8.7–10.2)
CHLORIDE SERPL-SCNC: 101 MMOL/L (ref 96–106)
CHOLEST SERPL-MCNC: 232 MG/DL (ref 100–199)
CHOLEST/HDLC SERPL: 3.2 RATIO (ref 0–5)
CO2 SERPL-SCNC: 25 MMOL/L (ref 20–29)
CREAT SERPL-MCNC: 0.88 MG/DL (ref 0.76–1.27)
EOSINOPHIL # BLD AUTO: 0.3 X10E3/UL (ref 0–0.4)
EOSINOPHIL NFR BLD AUTO: 6 %
ERYTHROCYTE [DISTWIDTH] IN BLOOD BY AUTOMATED COUNT: 12.4 % (ref 11.6–15.4)
GLOBULIN SER-MCNC: 2.8 G/DL (ref 1.5–4.5)
GLUCOSE SERPL-MCNC: 70 MG/DL (ref 65–99)
HCT VFR BLD AUTO: 45.6 % (ref 37.5–51)
HDLC SERPL-MCNC: 72 MG/DL
HGB BLD-MCNC: 15 G/DL (ref 13–17.7)
IMM GRANULOCYTES # BLD: 0 X10E3/UL (ref 0–0.1)
IMM GRANULOCYTES NFR BLD: 0 %
LDLC SERPL CALC-MCNC: 134 MG/DL (ref 0–99)
LYMPHOCYTES # BLD AUTO: 1.9 X10E3/UL (ref 0.7–3.1)
LYMPHOCYTES NFR BLD AUTO: 38 %
MCH RBC QN AUTO: 30.2 PG (ref 26.6–33)
MCHC RBC AUTO-ENTMCNC: 32.9 G/DL (ref 31.5–35.7)
MCV RBC AUTO: 92 FL (ref 79–97)
MONOCYTES # BLD AUTO: 0.4 X10E3/UL (ref 0.1–0.9)
MONOCYTES NFR BLD AUTO: 8 %
NEUTROPHILS # BLD AUTO: 2.3 X10E3/UL (ref 1.4–7)
NEUTROPHILS NFR BLD AUTO: 46 %
PLATELET # BLD AUTO: 242 X10E3/UL (ref 150–450)
POTASSIUM SERPL-SCNC: 4.5 MMOL/L (ref 3.5–5.2)
PROT SERPL-MCNC: 7.5 G/DL (ref 6–8.5)
RBC # BLD AUTO: 4.96 X10E6/UL (ref 4.14–5.8)
SL AMB EGFR AFRICAN AMERICAN: 109 ML/MIN/1.73
SL AMB EGFR NON AFRICAN AMERICAN: 95 ML/MIN/1.73
SL AMB VLDL CHOLESTEROL CALC: 26 MG/DL (ref 5–40)
SODIUM SERPL-SCNC: 142 MMOL/L (ref 134–144)
TRIGL SERPL-MCNC: 128 MG/DL (ref 0–149)
WBC # BLD AUTO: 5 X10E3/UL (ref 3.4–10.8)

## 2020-07-07 DIAGNOSIS — E78.2 MIXED HYPERLIPIDEMIA: Primary | ICD-10-CM

## 2020-10-24 LAB
CHOLEST SERPL-MCNC: 225 MG/DL (ref 100–199)
CHOLEST/HDLC SERPL: 3.4 RATIO (ref 0–5)
HDLC SERPL-MCNC: 67 MG/DL
LDLC SERPL CALC-MCNC: 140 MG/DL (ref 0–99)
SL AMB VLDL CHOLESTEROL CALC: 18 MG/DL (ref 5–40)
TRIGL SERPL-MCNC: 101 MG/DL (ref 0–149)

## 2020-10-27 ENCOUNTER — TELEPHONE (OUTPATIENT)
Dept: FAMILY MEDICINE CLINIC | Facility: CLINIC | Age: 60
End: 2020-10-27

## 2020-10-27 ENCOUNTER — OFFICE VISIT (OUTPATIENT)
Dept: FAMILY MEDICINE CLINIC | Facility: CLINIC | Age: 60
End: 2020-10-27
Payer: COMMERCIAL

## 2020-10-27 VITALS
BODY MASS INDEX: 21.66 KG/M2 | RESPIRATION RATE: 16 BRPM | WEIGHT: 138 LBS | HEART RATE: 62 BPM | DIASTOLIC BLOOD PRESSURE: 80 MMHG | TEMPERATURE: 97.9 F | HEIGHT: 67 IN | SYSTOLIC BLOOD PRESSURE: 124 MMHG

## 2020-10-27 DIAGNOSIS — E78.2 MIXED HYPERLIPIDEMIA: Primary | ICD-10-CM

## 2020-10-27 DIAGNOSIS — Z13.6 ENCOUNTER FOR SCREENING FOR STENOSIS OF CAROTID ARTERY: ICD-10-CM

## 2020-10-27 PROCEDURE — 3008F BODY MASS INDEX DOCD: CPT | Performed by: NURSE PRACTITIONER

## 2020-10-27 PROCEDURE — 99213 OFFICE O/P EST LOW 20 MIN: CPT | Performed by: NURSE PRACTITIONER

## 2020-11-04 ENCOUNTER — HOSPITAL ENCOUNTER (OUTPATIENT)
Dept: RADIOLOGY | Facility: HOSPITAL | Age: 60
Discharge: HOME | End: 2020-11-04
Payer: COMMERCIAL

## 2020-11-04 DIAGNOSIS — E78.2 MIXED HYPERLIPIDEMIA: ICD-10-CM

## 2020-11-04 DIAGNOSIS — Z13.6 ENCOUNTER FOR SCREENING FOR STENOSIS OF CAROTID ARTERY: ICD-10-CM

## 2020-11-04 PROCEDURE — VASC: Performed by: SURGERY

## 2020-11-04 PROCEDURE — 93922 UPR/L XTREMITY ART 2 LEVELS: CPT

## 2021-07-01 ENCOUNTER — OFFICE VISIT (OUTPATIENT)
Dept: FAMILY MEDICINE CLINIC | Facility: CLINIC | Age: 61
End: 2021-07-01
Payer: COMMERCIAL

## 2021-07-01 VITALS
OXYGEN SATURATION: 97 % | DIASTOLIC BLOOD PRESSURE: 88 MMHG | BODY MASS INDEX: 22.91 KG/M2 | WEIGHT: 146 LBS | RESPIRATION RATE: 16 BRPM | HEIGHT: 67 IN | HEART RATE: 56 BPM | TEMPERATURE: 97.3 F | SYSTOLIC BLOOD PRESSURE: 122 MMHG

## 2021-07-01 DIAGNOSIS — Z11.4 SCREENING FOR HIV (HUMAN IMMUNODEFICIENCY VIRUS): ICD-10-CM

## 2021-07-01 DIAGNOSIS — E55.9 VITAMIN D DEFICIENCY: ICD-10-CM

## 2021-07-01 DIAGNOSIS — E78.2 MIXED HYPERLIPIDEMIA: ICD-10-CM

## 2021-07-01 DIAGNOSIS — Z00.00 ANNUAL PHYSICAL EXAM: Primary | ICD-10-CM

## 2021-07-01 PROCEDURE — 99396 PREV VISIT EST AGE 40-64: CPT | Performed by: NURSE PRACTITIONER

## 2021-07-01 PROCEDURE — 3725F SCREEN DEPRESSION PERFORMED: CPT | Performed by: NURSE PRACTITIONER

## 2021-07-01 PROCEDURE — 3008F BODY MASS INDEX DOCD: CPT | Performed by: NURSE PRACTITIONER

## 2021-07-01 PROCEDURE — 1036F TOBACCO NON-USER: CPT | Performed by: NURSE PRACTITIONER

## 2021-07-01 PROCEDURE — 36415 COLL VENOUS BLD VENIPUNCTURE: CPT | Performed by: NURSE PRACTITIONER

## 2021-07-01 RX ORDER — ZINC 25 MG
25 TABLET ORAL
COMMUNITY

## 2021-07-01 NOTE — PATIENT INSTRUCTIONS
Heart healthy, carbohydrate controlled diet- limit red meat, limit saturated fat, moderate salt intake, limit junk food, etc    Regular exercise  Stress management  Routine labwork and screenings as ordered

## 2021-07-01 NOTE — PROGRESS NOTES
FAMILY PRACTICE HEALTH MAINTENANCE OFFICE VISIT  [facility]    NAME: Kavon Solano  AGE: 63 y o  SEX: male  : 1960     DATE: 2021    Assessment and Plan     1  Annual physical exam  Heart healthy, carbohydrate controlled diet- limit red meat, limit saturated fat, moderate salt intake, limit junk food, etc    Regular exercise  Stress management  Routine labwork and screenings as ordered  - CBC and differential  - Comprehensive metabolic panel  - Lipid panel  - Vitamin D 25 hydroxy  - Human Immunodeficiency Virus 1/2 Antigen / Antibody ( Fourth Generation) with Reflex Testing  2  Mixed hyperlipidemia  Heart healthy diet  Fish oil  Will check labs  - CBC and differential  - Comprehensive metabolic panel  - Lipid panel  3  Vitamin D deficiency  - CBC and differential  - Comprehensive metabolic panel  - Vitamin D 25 hydroxy  4  Screening for HIV (human immunodeficiency virus)  - Human Immunodeficiency Virus 1/2 Antigen / Antibody ( Fourth Generation) with Reflex Testing      · Patient Counseling:   · Nutrition: Stressed importance of a well balanced diet, moderation of sodium/saturated fat, caloric balance and sufficient intake of fiber  · Exercise: Stressed the importance of regular exercise with a goal of 150 minutes per week  · Dental Health: Discussed daily flossing and brushing and regular dental visits   · Alcohol Use:  Recommended moderation of alcohol intake  · Injury Prevention: Discussed Safety Belts, Safety Helmets, and Smoke Detectors    · Immunizations reviewed: Up To Date and Risks and Benefits discussed  · Discussed benefits of:  Colon Cancer Screening, Prostate Cancer Screening  and Screening labs   BMI Counseling: Body mass index is 22 87 kg/m²  Discussed with patient's BMI with him  BMI is wnl  Counseled on well balanced diet and regular exericse                Chief Complaint     Chief Complaint   Patient presents with   [facility] Exam       History of Present Illness     Here for annual exam  Pt reports that his insurance does not cover colon screening so he will be getting done  Feels well  No recent illness  No specific concerns or issues  Has made some dietary changes to help cholesterol numbers  Exercises regularly, rides bike several times a week  Well Adult Physical   Patient here for a comprehensive physical exam       Diet and Physical Activity  Diet: well balanced diet  Exercise: frequently      Depression Screen  PHQ-9 Depression Screening    PHQ-9:   Frequency of the following problems over the past two weeks:      Little interest or pleasure in doing things: 0 - not at all  Feeling down, depressed, or hopeless: 0 - not at all  PHQ-2 Score: 0     Depression Screening Follow-up Plan: Patient's depression screening was negative with a PHQ-2 score of 0  Clinically patient does not have depression  No treatment is required  Patient assessed for underlying major depression  They have no active suicidal ideations  Brief counseling provided and recommend additional follow-up/re-evaluation next office visit  General Health  Hearing: Normal:  bilateral  Vision: no vision problems  Dental: no dental visits for >1 year          The following portions of the patient's history were reviewed and updated as appropriate: allergies, current medications, past family history, past medical history, past social history, past surgical history and problem list     Review of Systems     Review of Systems   Constitutional: Negative for chills, diaphoresis, fatigue and fever  HENT: Negative for congestion, sinus pressure, sinus pain and sore throat  Eyes: Negative for visual disturbance  Respiratory: Negative for cough, chest tightness, shortness of breath and wheezing  Cardiovascular: Negative for chest pain, palpitations and leg swelling     Gastrointestinal: Negative for abdominal distention, abdominal pain, diarrhea and nausea  Endocrine: Negative for cold intolerance, heat intolerance, polydipsia, polyphagia and polyuria  Genitourinary: Negative for dysuria, frequency and urgency  Musculoskeletal: Negative for arthralgias, back pain and neck pain  Skin: Negative for rash  Allergic/Immunologic: Negative for immunocompromised state  Neurological: Negative for dizziness, weakness and headaches  Hematological: Negative for adenopathy  Psychiatric/Behavioral: Negative for confusion  The patient is not nervous/anxious  Past Medical History     History reviewed  No pertinent past medical history  Past Surgical History     Past Surgical History:   Procedure Laterality Date    NO PAST SURGERIES         Social History     Social History     Socioeconomic History    Marital status: Single     Spouse name: None    Number of children: None    Years of education: None    Highest education level: None   Occupational History    None   Tobacco Use    Smoking status: Never Smoker    Smokeless tobacco: Never Used   Vaping Use    Vaping Use: Never used   Substance and Sexual Activity    Alcohol use: Never    Drug use: Never    Sexual activity: None   Other Topics Concern    None   Social History Narrative    None     Social Determinants of Health     Financial Resource Strain:     Difficulty of Paying Living Expenses:    Food Insecurity:     Worried About Running Out of Food in the Last Year:     Ran Out of Food in the Last Year:    Transportation Needs:     Lack of Transportation (Medical):      Lack of Transportation (Non-Medical):    Physical Activity:     Days of Exercise per Week:     Minutes of Exercise per Session:    Stress:     Feeling of Stress :    Social Connections:     Frequency of Communication with Friends and Family:     Frequency of Social Gatherings with Friends and Family:     Attends Orthodoxy Services:     Active Member of Clubs or Organizations:     Attends Club or Organization Meetings:     Marital Status:    Intimate Partner Violence:     Fear of Current or Ex-Partner:     Emotionally Abused:     Physically Abused:     Sexually Abused:        Family History     Family History   Problem Relation Age of Onset    Diabetes type II Father         syage 2 chronic kidney disease    Cancer Maternal Grandmother     Substance Abuse Neg Hx     Mental illness Neg Hx        Current Medications       Current Outpatient Medications:     ascorbic acid (VITAMIN C) 500 mg tablet, Take 500 mg by mouth daily, Disp: , Rfl:     Cholecalciferol (VITAMIN D) 2000 units tablet, Take by mouth , Disp: , Rfl:     cyanocobalamin (VITAMIN B-12) 1000 MCG tablet, Take 1,500 mcg by mouth 2 (two) times a week, Disp: , Rfl:     Melatonin 1 MG CAPS, Take 1 mg by mouth as needed, Disp: , Rfl:     Omega-3 Fatty Acids (FISH OIL PO), Take by mouth daily, Disp: , Rfl:     Zinc 25 MG TABS, Take 25 tablets by mouth, Disp: , Rfl:      Allergies     No Known Allergies    Objective     /88 (BP Location: Right arm, Patient Position: Sitting, Cuff Size: Adult)   Pulse 56   Temp (!) 97 3 °F (36 3 °C) (Temporal)   Resp 16   Ht 5' 7" (1 702 m)   Wt 66 2 kg (146 lb)   SpO2 97%   BMI 22 87 kg/m²      Physical Exam  Vitals reviewed  Constitutional:       General: He is not in acute distress  Appearance: Normal appearance  He is well-developed  He is not ill-appearing  HENT:      Head: Normocephalic and atraumatic  Right Ear: Tympanic membrane and ear canal normal       Left Ear: Tympanic membrane and ear canal normal       Nose: Nose normal  No congestion  Mouth/Throat:      Mouth: Mucous membranes are moist       Pharynx: Oropharynx is clear  Eyes:      General: No scleral icterus  Extraocular Movements: Extraocular movements intact  Pupils: Pupils are equal, round, and reactive to light  Neck:      Thyroid: No thyromegaly  Vascular: No carotid bruit  Cardiovascular:      Rate and Rhythm: Normal rate and regular rhythm  Heart sounds: No murmur heard  Pulmonary:      Effort: Pulmonary effort is normal  No respiratory distress  Breath sounds: Normal breath sounds  No wheezing or rales  Abdominal:      General: Bowel sounds are normal  There is no distension  Palpations: Abdomen is soft  Tenderness: There is no abdominal tenderness  Musculoskeletal:         General: Normal range of motion  Cervical back: Normal range of motion and neck supple  Right lower leg: No edema  Left lower leg: No edema  Lymphadenopathy:      Cervical: No cervical adenopathy  Skin:     General: Skin is warm and dry  Coloration: Skin is not jaundiced or pale  Neurological:      General: No focal deficit present  Mental Status: He is alert and oriented to person, place, and time  Cranial Nerves: No cranial nerve deficit  Sensory: No sensory deficit  Psychiatric:         Mood and Affect: Mood normal          Behavior: Behavior normal          Thought Content: Thought content normal          Judgment: Judgment normal             Visual Acuity Screening    Right eye Left eye Both eyes   Without correction: 20/25 20/30 20/25   With correction:      Comments: Pt identified colors correctly             Jses Mendoza

## 2021-07-02 LAB
25(OH)D3+25(OH)D2 SERPL-MCNC: 37.6 NG/ML (ref 30–100)
ALBUMIN SERPL-MCNC: 4.4 G/DL (ref 3.8–4.9)
ALBUMIN/GLOB SERPL: 1.5 {RATIO} (ref 1.2–2.2)
ALP SERPL-CCNC: 75 IU/L (ref 48–121)
ALT SERPL-CCNC: 12 IU/L (ref 0–44)
AST SERPL-CCNC: 19 IU/L (ref 0–40)
BASOPHILS # BLD AUTO: 0.1 X10E3/UL (ref 0–0.2)
BASOPHILS NFR BLD AUTO: 2 %
BILIRUB SERPL-MCNC: 0.3 MG/DL (ref 0–1.2)
BUN SERPL-MCNC: 15 MG/DL (ref 6–24)
BUN/CREAT SERPL: 18 (ref 9–20)
CALCIUM SERPL-MCNC: 9.4 MG/DL (ref 8.7–10.2)
CHLORIDE SERPL-SCNC: 104 MMOL/L (ref 96–106)
CHOLEST SERPL-MCNC: 238 MG/DL (ref 100–199)
CHOLEST/HDLC SERPL: 3.8 RATIO (ref 0–5)
CO2 SERPL-SCNC: 23 MMOL/L (ref 20–29)
CREAT SERPL-MCNC: 0.85 MG/DL (ref 0.76–1.27)
EOSINOPHIL # BLD AUTO: 0.2 X10E3/UL (ref 0–0.4)
EOSINOPHIL NFR BLD AUTO: 3 %
ERYTHROCYTE [DISTWIDTH] IN BLOOD BY AUTOMATED COUNT: 12.1 % (ref 11.6–15.4)
GLOBULIN SER-MCNC: 2.9 G/DL (ref 1.5–4.5)
GLUCOSE SERPL-MCNC: 90 MG/DL (ref 65–99)
HCT VFR BLD AUTO: 45.8 % (ref 37.5–51)
HDLC SERPL-MCNC: 63 MG/DL
HGB BLD-MCNC: 15.8 G/DL (ref 13–17.7)
HIV 1+2 AB+HIV1 P24 AG SERPL QL IA: NON REACTIVE
IMM GRANULOCYTES # BLD: 0 X10E3/UL (ref 0–0.1)
IMM GRANULOCYTES NFR BLD: 0 %
LDLC SERPL CALC-MCNC: 156 MG/DL (ref 0–99)
LYMPHOCYTES # BLD AUTO: 2 X10E3/UL (ref 0.7–3.1)
LYMPHOCYTES NFR BLD AUTO: 36 %
MCH RBC QN AUTO: 30.7 PG (ref 26.6–33)
MCHC RBC AUTO-ENTMCNC: 34.5 G/DL (ref 31.5–35.7)
MCV RBC AUTO: 89 FL (ref 79–97)
MONOCYTES # BLD AUTO: 0.4 X10E3/UL (ref 0.1–0.9)
MONOCYTES NFR BLD AUTO: 7 %
NEUTROPHILS # BLD AUTO: 2.8 X10E3/UL (ref 1.4–7)
NEUTROPHILS NFR BLD AUTO: 52 %
PLATELET # BLD AUTO: 191 X10E3/UL (ref 150–450)
POTASSIUM SERPL-SCNC: 4.4 MMOL/L (ref 3.5–5.2)
PROT SERPL-MCNC: 7.3 G/DL (ref 6–8.5)
RBC # BLD AUTO: 5.15 X10E6/UL (ref 4.14–5.8)
SL AMB EGFR AFRICAN AMERICAN: 110 ML/MIN/1.73
SL AMB EGFR NON AFRICAN AMERICAN: 95 ML/MIN/1.73
SL AMB VLDL CHOLESTEROL CALC: 19 MG/DL (ref 5–40)
SODIUM SERPL-SCNC: 141 MMOL/L (ref 134–144)
TRIGL SERPL-MCNC: 108 MG/DL (ref 0–149)
WBC # BLD AUTO: 5.4 X10E3/UL (ref 3.4–10.8)

## 2021-07-14 ENCOUNTER — TELEPHONE (OUTPATIENT)
Dept: FAMILY MEDICINE CLINIC | Facility: CLINIC | Age: 61
End: 2021-07-14

## 2021-07-14 NOTE — TELEPHONE ENCOUNTER
Pt called and spoke to his insurance company  He stated they will cover the medication and he would like it ordered and sent to the [facility] in [place]  I added [facility] as a pharmacy in his chart

## 2022-06-27 ENCOUNTER — TELEPHONE (OUTPATIENT)
Dept: FAMILY MEDICINE CLINIC | Facility: CLINIC | Age: 62
End: 2022-06-27

## 2022-06-27 DIAGNOSIS — E55.9 VITAMIN D DEFICIENCY: ICD-10-CM

## 2022-06-27 DIAGNOSIS — E78.2 MIXED HYPERLIPIDEMIA: Primary | ICD-10-CM

## 2022-06-27 DIAGNOSIS — Z00.00 ANNUAL PHYSICAL EXAM: ICD-10-CM

## 2022-06-27 NOTE — TELEPHONE ENCOUNTER
Tried calling patient to advise him to have his labs done prior to his appt on 7/1  Phone message says pt is not accepting calls at this time  Mailed lab orders      Daniele Figueredo

## 2022-10-25 LAB
25(OH)D3+25(OH)D2 SERPL-MCNC: 52.2 NG/ML (ref 30–100)
ALBUMIN SERPL-MCNC: 4.9 G/DL (ref 3.8–4.9)
ALBUMIN/GLOB SERPL: 2 {RATIO} (ref 1.2–2.2)
ALP SERPL-CCNC: 82 IU/L (ref 44–121)
ALT SERPL-CCNC: 11 IU/L (ref 0–44)
AST SERPL-CCNC: 18 IU/L (ref 0–40)
BASOPHILS # BLD AUTO: 0.1 X10E3/UL (ref 0–0.2)
BASOPHILS NFR BLD AUTO: 2 %
BILIRUB SERPL-MCNC: 0.4 MG/DL (ref 0–1.2)
BUN SERPL-MCNC: 12 MG/DL (ref 8–27)
BUN/CREAT SERPL: 14 (ref 10–24)
CALCIUM SERPL-MCNC: 9.9 MG/DL (ref 8.6–10.2)
CHLORIDE SERPL-SCNC: 101 MMOL/L (ref 96–106)
CHOLEST SERPL-MCNC: 228 MG/DL (ref 100–199)
CHOLEST/HDLC SERPL: 3.2 RATIO (ref 0–5)
CO2 SERPL-SCNC: 26 MMOL/L (ref 20–29)
CREAT SERPL-MCNC: 0.84 MG/DL (ref 0.76–1.27)
EGFR: 100 ML/MIN/1.73
EOSINOPHIL # BLD AUTO: 0.2 X10E3/UL (ref 0–0.4)
EOSINOPHIL NFR BLD AUTO: 3 %
ERYTHROCYTE [DISTWIDTH] IN BLOOD BY AUTOMATED COUNT: 11.9 % (ref 11.6–15.4)
GLOBULIN SER-MCNC: 2.4 G/DL (ref 1.5–4.5)
GLUCOSE SERPL-MCNC: 96 MG/DL (ref 70–99)
HCT VFR BLD AUTO: 45 % (ref 37.5–51)
HDLC SERPL-MCNC: 72 MG/DL
HGB BLD-MCNC: 15.3 G/DL (ref 13–17.7)
IMM GRANULOCYTES # BLD: 0 X10E3/UL (ref 0–0.1)
IMM GRANULOCYTES NFR BLD: 1 %
LDLC SERPL CALC-MCNC: 141 MG/DL (ref 0–99)
LYMPHOCYTES # BLD AUTO: 2 X10E3/UL (ref 0.7–3.1)
LYMPHOCYTES NFR BLD AUTO: 34 %
MCH RBC QN AUTO: 30.5 PG (ref 26.6–33)
MCHC RBC AUTO-ENTMCNC: 34 G/DL (ref 31.5–35.7)
MCV RBC AUTO: 90 FL (ref 79–97)
MONOCYTES # BLD AUTO: 0.4 X10E3/UL (ref 0.1–0.9)
MONOCYTES NFR BLD AUTO: 7 %
NEUTROPHILS # BLD AUTO: 3.1 X10E3/UL (ref 1.4–7)
NEUTROPHILS NFR BLD AUTO: 53 %
PLATELET # BLD AUTO: 196 X10E3/UL (ref 150–450)
POTASSIUM SERPL-SCNC: 4.3 MMOL/L (ref 3.5–5.2)
PROT SERPL-MCNC: 7.3 G/DL (ref 6–8.5)
RBC # BLD AUTO: 5.01 X10E6/UL (ref 4.14–5.8)
SL AMB VLDL CHOLESTEROL CALC: 15 MG/DL (ref 5–40)
SODIUM SERPL-SCNC: 141 MMOL/L (ref 134–144)
TRIGL SERPL-MCNC: 86 MG/DL (ref 0–149)
WBC # BLD AUTO: 5.9 X10E3/UL (ref 3.4–10.8)

## 2022-11-15 ENCOUNTER — OFFICE VISIT (OUTPATIENT)
Dept: FAMILY MEDICINE CLINIC | Facility: CLINIC | Age: 62
End: 2022-11-15

## 2022-11-15 VITALS
HEIGHT: 69 IN | OXYGEN SATURATION: 98 % | HEART RATE: 60 BPM | SYSTOLIC BLOOD PRESSURE: 108 MMHG | DIASTOLIC BLOOD PRESSURE: 76 MMHG | BODY MASS INDEX: 20.29 KG/M2 | RESPIRATION RATE: 16 BRPM | TEMPERATURE: 96.4 F | WEIGHT: 137 LBS

## 2022-11-15 DIAGNOSIS — Z12.5 SCREENING FOR MALIGNANT NEOPLASM OF PROSTATE: ICD-10-CM

## 2022-11-15 DIAGNOSIS — Z00.00 ANNUAL PHYSICAL EXAM: Primary | ICD-10-CM

## 2022-11-15 NOTE — PATIENT INSTRUCTIONS
Heart healthy, carbohydrate controlled diet- limit red meat, limit saturated fat, moderate salt intake, limit junk food, etc    Regular exercise  Stress management  Routine labwork and screenings as ordered  Colon cancer screening is recommended  Please call your insurance company to see if they will cover a colonoscopy or cologuard  Will check prostate lab as discussed

## 2022-11-15 NOTE — PROGRESS NOTES
FAMILY PRACTICE HEALTH MAINTENANCE OFFICE VISIT  [facility] - [facility] [place] [facility]    NAME: Donta Barrera  AGE: 63 y o  SEX: male  : 1960     DATE: 11/15/2022    Assessment and Plan     1  Annual physical exam  Heart healthy, carbohydrate controlled diet- limit red meat, limit saturated fat, moderate salt intake, limit junk food, etc    Regular exercise  Stress management  Routine labwork and screenings as ordered  Colon cancer screening is recommended  Please call your insurance company to see if they will cover a colonoscopy or cologuard  Will check prostate lab as discussed  2  Screening for malignant neoplasm of prostate  - PSA Total, Diagnostic; Future      · Patient Counseling:   · Nutrition: Stressed importance of a well balanced diet, moderation of sodium/saturated fat, caloric balance and sufficient intake of fiber  · Exercise: Stressed the importance of regular exercise with a goal of 150 minutes per week  · Dental Health: Discussed daily flossing and brushing and regular dental visits     · Immunizations reviewed: Risks and Benefits discussed and Declined recommended vaccinations  · Discussed benefits of:  Colon Cancer Screening, Prostate Cancer Screening  and Screening labs  BMI Counseling: Body mass index is 20 23 kg/m²  Discussed with patient's BMI with him  BMI is wnl  Counseled on well balanced diet and regular exericse  Chief Complaint     Chief Complaint   Patient presents with   • Physical Exam       History of Present Illness     Here for annual exam  Has been diagnosed with high chol in past and had rx for crestor  Does not take  Thought had "pesticide poisoning" after eating "squash that grew in the garden"  Had head pressure, ears popping, and nasal congestion     Not taking any prescription meds        Well Adult Physical   Patient here for a comprehensive physical exam       Diet and Physical Activity  Diet: well balanced diet  Exercise: frequently      Depression Screen  PHQ-2/9 Depression Screening    Little interest or pleasure in doing things: 0 - not at all  Feeling down, depressed, or hopeless: 0 - not at all  PHQ-2 Score: 0  PHQ-2 Interpretation: Negative depression screen     Depression Screening Follow-up Plan: Patient's depression screening was negative with a PHQ-2 score of 0  Clinically patient does not have depression  No treatment is required  General Health  Hearing: Normal:  bilateral  Vision: no vision problems  Dental: no dental visits for >1 year          The following portions of the patient's history were reviewed and updated as appropriate: allergies, current medications, past family history, past medical history, past social history, past surgical history and problem list     Review of Systems     Review of Systems   Constitutional: Negative for chills, diaphoresis, fatigue and fever  HENT: Negative for congestion, sinus pressure, sinus pain and sore throat  Eyes: Negative for visual disturbance  Respiratory: Negative for cough, chest tightness, shortness of breath and wheezing  Cardiovascular: Negative for chest pain, palpitations and leg swelling  Gastrointestinal: Negative for abdominal distention, abdominal pain, diarrhea and nausea  Endocrine: Negative for polydipsia and polyuria  Genitourinary: Negative for dysuria, frequency and urgency  Musculoskeletal: Negative for arthralgias, back pain and neck pain  Skin: Negative for rash  Allergic/Immunologic: Negative for immunocompromised state  Neurological: Negative for dizziness, weakness and headaches  Hematological: Negative for adenopathy  Psychiatric/Behavioral: Negative for dysphoric mood  The patient is not nervous/anxious  Past Medical History     History reviewed  No pertinent past medical history      Past Surgical History     Past Surgical History:   Procedure Laterality Date   • NO PAST SURGERIES Social History     Social History     Socioeconomic History   • Marital status: Single     Spouse name: None   • Number of children: None   • Years of education: None   • Highest education level: None   Occupational History   • None   Tobacco Use   • Smoking status: Never Smoker   • Smokeless tobacco: Never Used   Vaping Use   • Vaping Use: Never used   Substance and Sexual Activity   • Alcohol use: Never   • Drug use: Never   • Sexual activity: None   Other Topics Concern   • None   Social History Narrative   • None     Social Determinants of Health     Financial Resource Strain: Not on file   Food Insecurity: Not on file   Transportation Needs: Not on file   Physical Activity: Not on file   Stress: Not on file   Social Connections: Not on file   Intimate Partner Violence: Not on file   Housing Stability: Not on file       Family History     Family History   Problem Relation Age of Onset   • Diabetes type II Father         syage 2 chronic kidney disease   • Heart attack Brother    • Cancer Maternal Grandmother    • Substance Abuse Neg Hx    • Mental illness Neg Hx        Current Medications       Current Outpatient Medications:   •  Cholecalciferol (VITAMIN D) 2000 units tablet, Take by mouth , Disp: , Rfl:   •  patient supplied medication, daily at bedtime NATURES TRUTH ESSENTIAL OIL, Disp: , Rfl:   •  Zinc 25 MG TABS, Take 25 tablets by mouth, Disp: , Rfl:      Allergies     No Known Allergies    Objective     Recent Results (from the past 672 hour(s))   CBC and differential    Collection Time: 10/24/22  9:34 AM   Result Value Ref Range    White Blood Cell Count 5 9 3 4 - 10 8 x10E3/uL    Red Blood Cell Count 5 01 4 14 - 5 80 x10E6/uL    Hemoglobin 15 3 13 0 - 17 7 g/dL    HCT 45 0 37 5 - 51 0 %    MCV 90 79 - 97 fL    MCH 30 5 26 6 - 33 0 pg    MCHC 34 0 31 5 - 35 7 g/dL    RDW 11 9 11 6 - 15 4 %    Platelet Count 314 128 - 450 x10E3/uL    Neutrophils 53 Not Estab  %    Lymphocytes 34 Not Estab  % Monocytes 7 Not Estab  %    Eosinophils 3 Not Estab  %    Basophils PCT 2 Not Estab  %    Neutrophils (Absolute) 3 1 1 4 - 7 0 x10E3/uL    Lymphocytes (Absolute) 2 0 0 7 - 3 1 x10E3/uL    Monocytes (Absolute) 0 4 0 1 - 0 9 x10E3/uL    Eosinophils (Absolute) 0 2 0 0 - 0 4 x10E3/uL    Basophils ABS 0 1 0 0 - 0 2 x10E3/uL    Immature Granulocytes 1 Not Estab  %    Immature Granulocytes (Absolute) 0 0 0 0 - 0 1 x10E3/uL   Comprehensive metabolic panel    Collection Time: 10/24/22  9:34 AM   Result Value Ref Range    Glucose, Random 96 70 - 99 mg/dL    BUN 12 8 - 27 mg/dL    Creatinine 0 84 0 76 - 1 27 mg/dL    eGFR 100 >59 mL/min/1 73    SL AMB BUN/CREATININE RATIO 14 10 - 24    Sodium 141 134 - 144 mmol/L    Potassium 4 3 3 5 - 5 2 mmol/L    Chloride 101 96 - 106 mmol/L    CO2 26 20 - 29 mmol/L    CALCIUM 9 9 8 6 - 10 2 mg/dL    Protein, Total 7 3 6 0 - 8 5 g/dL    Albumin 4 9 3 8 - 4 9 g/dL    Globulin, Total 2 4 1 5 - 4 5 g/dL    Albumin/Globulin Ratio 2 0 1 2 - 2 2    TOTAL BILIRUBIN 0 4 0 0 - 1 2 mg/dL    Alk Phos Isoenzymes 82 44 - 121 IU/L    AST 18 0 - 40 IU/L    ALT 11 0 - 44 IU/L   Vitamin D 25 hydroxy    Collection Time: 10/24/22  9:34 AM   Result Value Ref Range    25-HYDROXY VIT D 52 2 30 0 - 100 0 ng/mL   Lipid panel    Collection Time: 10/24/22  9:34 AM   Result Value Ref Range    Cholesterol, Total 228 (H) 100 - 199 mg/dL    Triglycerides 86 0 - 149 mg/dL    HDL 72 >39 mg/dL    VLDL Cholesterol Calculated 15 5 - 40 mg/dL    LDL Calculated 141 (H) 0 - 99 mg/dL    T  Chol/HDL Ratio 3 2 0 0 - 5 0 ratio     Reviewed lab/diagnostic results with pt including both normal and abnormal findings  In depth counseling and instructions given  All questions answered during visit  /76   Pulse 60   Temp (!) 96 4 °F (35 8 °C) (Temporal)   Resp 16   Ht 5' 9" (1 753 m)   Wt 62 1 kg (137 lb)   SpO2 98%   BMI 20 23 kg/m²      Physical Exam  Constitutional:       General: He is not in acute distress  Appearance: He is well-developed  He is not ill-appearing  HENT:      Head: Normocephalic and atraumatic  Right Ear: Tympanic membrane and ear canal normal       Left Ear: Tympanic membrane and ear canal normal       Nose: Nose normal       Mouth/Throat:      Mouth: Mucous membranes are moist       Pharynx: Oropharynx is clear  Eyes:      General: No scleral icterus  Extraocular Movements: Extraocular movements intact  Pupils: Pupils are equal, round, and reactive to light  Neck:      Thyroid: No thyromegaly  Vascular: No carotid bruit  Cardiovascular:      Rate and Rhythm: Normal rate and regular rhythm  Heart sounds: No murmur heard  Pulmonary:      Effort: Pulmonary effort is normal  No respiratory distress  Breath sounds: Normal breath sounds  No wheezing or rales  Abdominal:      General: Bowel sounds are normal  There is no distension  Palpations: Abdomen is soft  Tenderness: There is no abdominal tenderness  Musculoskeletal:         General: Normal range of motion  Cervical back: Normal range of motion and neck supple  Right lower leg: No edema  Left lower leg: No edema  Lymphadenopathy:      Cervical: No cervical adenopathy  Skin:     General: Skin is warm and dry  Coloration: Skin is not jaundiced or pale  Neurological:      General: No focal deficit present  Mental Status: He is alert and oriented to person, place, and time  Cranial Nerves: No cranial nerve deficit  Sensory: No sensory deficit  Psychiatric:         Mood and Affect: Mood normal          Behavior: Behavior normal          Thought Content:  Thought content normal          Judgment: Judgment normal             Visual Acuity Screening    Right eye Left eye Both eyes   Without correction: 20/30 20/30 20/30   With correction:      Comments: [facility] [address] [place] [address]

## 2022-11-19 LAB — PSA SERPL-MCNC: 1.1 NG/ML (ref 0–4)

## 2023-07-24 ENCOUNTER — OFFICE VISIT (OUTPATIENT)
Dept: FAMILY MEDICINE CLINIC | Facility: CLINIC | Age: 63
End: 2023-07-24
Payer: COMMERCIAL

## 2023-07-24 VITALS
HEIGHT: 69 IN | OXYGEN SATURATION: 98 % | BODY MASS INDEX: 21.33 KG/M2 | RESPIRATION RATE: 16 BRPM | WEIGHT: 144 LBS | DIASTOLIC BLOOD PRESSURE: 82 MMHG | TEMPERATURE: 97.2 F | HEART RATE: 62 BPM | SYSTOLIC BLOOD PRESSURE: 116 MMHG

## 2023-07-24 DIAGNOSIS — Z83.3 FAMILY HISTORY OF DIABETES MELLITUS (DM): ICD-10-CM

## 2023-07-24 DIAGNOSIS — Z83.49 FAMILY HISTORY OF HEMOCHROMATOSIS: ICD-10-CM

## 2023-07-24 DIAGNOSIS — R25.3 MUSCLE TWITCHING: ICD-10-CM

## 2023-07-24 DIAGNOSIS — E55.9 VITAMIN D DEFICIENCY: ICD-10-CM

## 2023-07-24 DIAGNOSIS — G47.9 SLEEP DISTURBANCE: ICD-10-CM

## 2023-07-24 DIAGNOSIS — Z13.1 SCREENING FOR DIABETES MELLITUS (DM): ICD-10-CM

## 2023-07-24 DIAGNOSIS — E78.2 MIXED HYPERLIPIDEMIA: Primary | ICD-10-CM

## 2023-07-24 DIAGNOSIS — R68.89 COLD INTOLERANCE: ICD-10-CM

## 2023-07-24 DIAGNOSIS — K05.6 SORE GUMS: ICD-10-CM

## 2023-07-24 DIAGNOSIS — E63.8 IMBALANCED NUTRITION: ICD-10-CM

## 2023-07-24 PROCEDURE — 99214 OFFICE O/P EST MOD 30 MIN: CPT | Performed by: NURSE PRACTITIONER

## 2023-07-24 NOTE — PROGRESS NOTES
Name: Cheri Encarnacion      : 1960      MRN: [id number]  Encounter Provider: KARIE Cortes  Encounter Date: 2023   Encounter department: [address] [place] [address] [place]   1. Mixed hyperlipidemia  Reviewed heart healthy diet in depth  - Comprehensive metabolic panel; Future  - Lipid panel; Future    2. Vitamin D deficiency  - Comprehensive metabolic panel; Future  - Vitamin D 25 hydroxy; Future    3. Sleep disturbance  - Comprehensive metabolic panel; Future  - TSH, 3rd generation; Future    4. Family history of hemochromatosis  - CBC and differential; Future  - Iron; Future    5. Cold intolerance  - CBC and differential; Future  - TSH, 3rd generation; Future  6. Sore gums  - Zinc; Future    7. Family history of diabetes mellitus (DM)  - Hemoglobin A1C; Future    8. Screening for diabetes mellitus (DM)  - Hemoglobin A1C; Future    9. Imbalanced nutrition  - Comprehensive metabolic panel; Future  - Magnesium; Future  - Zinc; Future    10. Muscle twitching  - CBC and differential; Future  - Comprehensive metabolic panel; Future  - Magnesium; Future    Follow up in office after lab results available for review. Subjective      Here to discuss diet/nutrition  Concerned about intermittent symptoms. Reports extreme stress , legs tremble at times when in bed, trouble sleeping, works around pesticides, dizzy at times, cold feet, sore gums  Feels like all of his symptoms are due to dietary issues and deficiencies  Diagnosed with dyslipidemia and was advised to start heart healthy diet. Pt states that he cut all meat out of diet and has been eating basically only fruits and vegetables for past year. Recently "read a book that explained how foods are toxic to your system" and he now believes that he has build up of "toxic oxilates" from his food.    States that he eats a large spinach salad a couple of times a week and now thinks "the spinach is depositing in different parts of my body"  Reports that father had hemochromatosis and has never had iron levels checked. Would like to have extensive bloodwork done. Review of Systems   Constitutional: Positive for fatigue. Respiratory: Negative for chest tightness and shortness of breath. Cardiovascular: Negative for chest pain and palpitations. Gastrointestinal: Negative for diarrhea, nausea and vomiting. Endocrine: Positive for cold intolerance. Musculoskeletal:        Muscle twitching   Neurological: Positive for dizziness and tremors. Hematological: Does not bruise/bleed easily. Psychiatric/Behavioral: Positive for sleep disturbance. Current Outpatient Medications on File Prior to Visit   Medication Sig   • Cholecalciferol (VITAMIN D) 2000 units tablet Take by mouth    • Zinc 25 MG TABS Take 25 tablets by mouth if needed   • patient supplied medication daily at bedtime NATURES TRUTH ESSENTIAL OIL (Patient not taking: Reported on 7/24/2023)       Objective     /82   Pulse 62   Temp (!) 97.2 °F (36.2 °C) (Temporal)   Resp 16   Ht 5' 9" (1.753 m)   Wt 65.3 kg (144 lb)   SpO2 98%   BMI 21.27 kg/m²     Physical Exam  Vitals reviewed. Constitutional:       General: He is not in acute distress. Appearance: He is normal weight. He is not ill-appearing. Cardiovascular:      Rate and Rhythm: Normal rate. Pulmonary:      Effort: Pulmonary effort is normal.   Musculoskeletal:         General: No deformity or signs of injury. Normal range of motion. Right lower leg: No edema. Left lower leg: No edema. Skin:     General: Skin is warm and dry. Coloration: Skin is not jaundiced or pale. Neurological:      General: No focal deficit present. Mental Status: He is alert and oriented to person, place, and time. Cranial Nerves: No cranial nerve deficit. Sensory: No sensory deficit.        KARIE Gasca

## 2023-07-28 LAB
25(OH)D3+25(OH)D2 SERPL-MCNC: 47.8 NG/ML (ref 30–100)
ALBUMIN SERPL-MCNC: 4.7 G/DL (ref 3.9–4.9)
ALBUMIN/GLOB SERPL: 1.8 {RATIO} (ref 1.2–2.2)
ALP SERPL-CCNC: 77 IU/L (ref 44–121)
ALT SERPL-CCNC: 12 IU/L (ref 0–44)
AST SERPL-CCNC: 18 IU/L (ref 0–40)
BASOPHILS # BLD AUTO: 0.1 X10E3/UL (ref 0–0.2)
BASOPHILS NFR BLD AUTO: 2 %
BILIRUB SERPL-MCNC: 0.3 MG/DL (ref 0–1.2)
BUN SERPL-MCNC: 9 MG/DL (ref 8–27)
BUN/CREAT SERPL: 10 (ref 10–24)
CALCIUM SERPL-MCNC: 9.6 MG/DL (ref 8.6–10.2)
CHLORIDE SERPL-SCNC: 100 MMOL/L (ref 96–106)
CHOLEST SERPL-MCNC: 212 MG/DL (ref 100–199)
CHOLEST/HDLC SERPL: 3.7 RATIO (ref 0–5)
CO2 SERPL-SCNC: 25 MMOL/L (ref 20–29)
CREAT SERPL-MCNC: 0.94 MG/DL (ref 0.76–1.27)
EGFR: 92 ML/MIN/1.73
EOSINOPHIL # BLD AUTO: 0.2 X10E3/UL (ref 0–0.4)
EOSINOPHIL NFR BLD AUTO: 3 %
ERYTHROCYTE [DISTWIDTH] IN BLOOD BY AUTOMATED COUNT: 11.9 % (ref 11.6–15.4)
EST. AVERAGE GLUCOSE BLD GHB EST-MCNC: 103 MG/DL
GLOBULIN SER-MCNC: 2.6 G/DL (ref 1.5–4.5)
GLUCOSE SERPL-MCNC: 96 MG/DL (ref 70–99)
HBA1C MFR BLD: 5.2 % (ref 4.8–5.6)
HCT VFR BLD AUTO: 47.3 % (ref 37.5–51)
HDLC SERPL-MCNC: 58 MG/DL
HGB BLD-MCNC: 15.8 G/DL (ref 13–17.7)
IMM GRANULOCYTES # BLD: 0 X10E3/UL (ref 0–0.1)
IMM GRANULOCYTES NFR BLD: 0 %
IRON SERPL-MCNC: 69 UG/DL (ref 38–169)
LDLC SERPL CALC-MCNC: 135 MG/DL (ref 0–99)
LYMPHOCYTES # BLD AUTO: 2.1 X10E3/UL (ref 0.7–3.1)
LYMPHOCYTES NFR BLD AUTO: 40 %
MAGNESIUM SERPL-MCNC: 2 MG/DL (ref 1.6–2.3)
MCH RBC QN AUTO: 30.4 PG (ref 26.6–33)
MCHC RBC AUTO-ENTMCNC: 33.4 G/DL (ref 31.5–35.7)
MCV RBC AUTO: 91 FL (ref 79–97)
MONOCYTES # BLD AUTO: 0.4 X10E3/UL (ref 0.1–0.9)
MONOCYTES NFR BLD AUTO: 8 %
NEUTROPHILS # BLD AUTO: 2.5 X10E3/UL (ref 1.4–7)
NEUTROPHILS NFR BLD AUTO: 47 %
PLATELET # BLD AUTO: 211 X10E3/UL (ref 150–450)
POTASSIUM SERPL-SCNC: 4.4 MMOL/L (ref 3.5–5.2)
PROT SERPL-MCNC: 7.3 G/DL (ref 6–8.5)
RBC # BLD AUTO: 5.2 X10E6/UL (ref 4.14–5.8)
SL AMB VLDL CHOLESTEROL CALC: 19 MG/DL (ref 5–40)
SODIUM SERPL-SCNC: 140 MMOL/L (ref 134–144)
TRIGL SERPL-MCNC: 106 MG/DL (ref 0–149)
TSH SERPL DL<=0.005 MIU/L-ACNC: 1.74 UIU/ML (ref 0.45–4.5)
WBC # BLD AUTO: 5.3 X10E3/UL (ref 3.4–10.8)
ZINC SERPL-MCNC: 92 UG/DL (ref 44–115)

## 2023-07-31 ENCOUNTER — OFFICE VISIT (OUTPATIENT)
Dept: FAMILY MEDICINE CLINIC | Facility: CLINIC | Age: 63
End: 2023-07-31
Payer: COMMERCIAL

## 2023-07-31 VITALS
DIASTOLIC BLOOD PRESSURE: 76 MMHG | WEIGHT: 141 LBS | SYSTOLIC BLOOD PRESSURE: 112 MMHG | BODY MASS INDEX: 20.88 KG/M2 | RESPIRATION RATE: 16 BRPM | HEART RATE: 54 BPM | OXYGEN SATURATION: 97 % | TEMPERATURE: 97.1 F | HEIGHT: 69 IN

## 2023-07-31 DIAGNOSIS — E78.2 MIXED HYPERLIPIDEMIA: Primary | ICD-10-CM

## 2023-07-31 DIAGNOSIS — R68.89 MULTIPLE SOMATIC COMPLAINTS: ICD-10-CM

## 2023-07-31 PROCEDURE — 99213 OFFICE O/P EST LOW 20 MIN: CPT | Performed by: NURSE PRACTITIONER

## 2023-07-31 NOTE — PROGRESS NOTES
Name: Mario Alberto Araujo      : 1960      MRN: [id number]  Encounter Provider: KARIE Telles  Encounter Date: 2023   Encounter department: [address] [place] [address]   1. Mixed hyperlipidemia  Heart healthy diet. Will repeat lipid and cmp in 6 months  - Ambulatory Referral to Nutrition Services; Future  - Comprehensive metabolic panel; Future  - Lipid panel; Future    2. Multiple somatic complaints  - Ambulatory Referral to Nutrition Services; Future      Will refer to nutrition for education. Given written educational information about heart healthy diet. Patient was counseled regarding instructions for management which included: impression/diagnosis, risk/benefits of treatment options, importance of compliance with treatment, risk factor reduction, and prognosis. I have reviewed the instructions with the patient answering all questions and patient verbalized understanding. Subjective      Here for lab review  Seen in office last week with concerns about multiple symptoms and relationship to diet. Pt reported he read a book about how diet affects body and feels like he has "all of the symptoms" stemming from diet. Believes that he has "deposits of minerals and things that don't belong in different places in body"  Wanted extensive labs done  And here to review results. Review of Systems   Constitutional: Negative for unexpected weight change. Allergic/Immunologic: Negative for immunocompromised state. Psychiatric/Behavioral: The patient is nervous/anxious.         Current Outpatient Medications on File Prior to Visit   Medication Sig   • Cholecalciferol (VITAMIN D) 2000 units tablet Take by mouth    • Zinc 25 MG TABS Take 25 tablets by mouth if needed   • patient supplied medication daily at bedtime [facility] (Patient not taking: Reported on 2023)       Objective     Recent Results (from the past 672 hour(s))   CBC and differential    Collection Time: 07/25/23  9:46 AM   Result Value Ref Range    White Blood Cell Count 5.3 3.4 - 10.8 x10E3/uL    Red Blood Cell Count 5.20 4. 14 - 5.80 x10E6/uL    Hemoglobin 15.8 13.0 - 17.7 g/dL    HCT 47.3 37.5 - 51.0 %    MCV 91 79 - 97 fL    MCH 30.4 26.6 - 33.0 pg    MCHC 33.4 31.5 - 35.7 g/dL    RDW 11.9 11.6 - 15.4 %    Platelet Count 236 740 - 450 x10E3/uL    Neutrophils 47 Not Estab. %    Lymphocytes 40 Not Estab. %    Monocytes 8 Not Estab. %    Eosinophils 3 Not Estab. %    Basophils PCT 2 Not Estab. %    Neutrophils (Absolute) 2.5 1.4 - 7.0 x10E3/uL    Lymphocytes (Absolute) 2.1 0.7 - 3.1 x10E3/uL    Monocytes (Absolute) 0.4 0.1 - 0.9 x10E3/uL    Eosinophils (Absolute) 0.2 0.0 - 0.4 x10E3/uL    Basophils ABS 0.1 0.0 - 0.2 x10E3/uL    Immature Granulocytes 0 Not Estab. %    Immature Granulocytes (Absolute) 0.0 0.0 - 0.1 x10E3/uL   Comprehensive metabolic panel    Collection Time: 07/25/23  9:46 AM   Result Value Ref Range    Glucose, Random 96 70 - 99 mg/dL    BUN 9 8 - 27 mg/dL    Creatinine 0.94 0.76 - 1.27 mg/dL    eGFR 92 >59 mL/min/1.73    SL AMB BUN/CREATININE RATIO 10 10 - 24    Sodium 140 134 - 144 mmol/L    Potassium 4.4 3.5 - 5.2 mmol/L    Chloride 100 96 - 106 mmol/L    CO2 25 20 - 29 mmol/L    CALCIUM 9.6 8.6 - 10.2 mg/dL    Protein, Total 7.3 6.0 - 8.5 g/dL    Albumin 4.7 3.9 - 4.9 g/dL    Globulin, Total 2.6 1.5 - 4.5 g/dL    Albumin/Globulin Ratio 1.8 1.2 - 2.2    TOTAL BILIRUBIN 0.3 0.0 - 1.2 mg/dL    Alk Phos Isoenzymes 77 44 - 121 IU/L    AST 18 0 - 40 IU/L    ALT 12 0 - 44 IU/L   Iron    Collection Time: 07/25/23  9:46 AM   Result Value Ref Range    Iron, Serum 69 38 - 169 ug/dL   Magnesium    Collection Time: 07/25/23  9:46 AM   Result Value Ref Range    Magnesium, Serum 2.0 1.6 - 2.3 mg/dL   Vitamin D 25 hydroxy    Collection Time: 07/25/23  9:46 AM   Result Value Ref Range    25-HYDROXY VIT D 47.8 30.0 - 100.0 ng/mL   Lipid panel    Collection Time: 07/25/23  9:46 AM Result Value Ref Range    Cholesterol, Total 212 (H) 100 - 199 mg/dL    Triglycerides 106 0 - 149 mg/dL    HDL 58 >39 mg/dL    VLDL Cholesterol Calculated 19 5 - 40 mg/dL    LDL Calculated 135 (H) 0 - 99 mg/dL    T. Chol/HDL Ratio 3.7 0.0 - 5.0 ratio   Hemoglobin A1C    Collection Time: 07/25/23  9:46 AM   Result Value Ref Range    Hemoglobin A1C 5.2 4.8 - 5.6 %    Estimated Average Glucose 103 mg/dL   TSH, 3rd generation    Collection Time: 07/25/23  9:46 AM   Result Value Ref Range    TSH 1.740 0.450 - 4.500 uIU/mL   Zinc    Collection Time: 07/25/23  9:46 AM   Result Value Ref Range    Zinc 92 44 - 115 ug/dL     Reviewed lab/diagnostic results with pt including both normal and abnormal findings. In depth counseling and instructions given. All questions answered during visit. /76   Pulse (!) 54   Temp (!) 97.1 °F (36.2 °C) (Temporal)   Resp 16   Ht 5' 9" (1.753 m)   Wt 64 kg (141 lb)   SpO2 97%   BMI 20.82 kg/m²     Physical Exam  Vitals reviewed. Constitutional:       General: He is not in acute distress. Cardiovascular:      Rate and Rhythm: Normal rate. Pulmonary:      Effort: Pulmonary effort is normal.   Skin:     General: Skin is warm and dry. Neurological:      General: No focal deficit present. Mental Status: He is alert and oriented to person, place, and time. Psychiatric:         Mood and Affect: Mood normal.         Thought Content:  Thought content normal.       Charlanne Boeck

## 2023-08-21 ENCOUNTER — CLINICAL SUPPORT (OUTPATIENT)
Dept: NUTRITION | Facility: HOSPITAL | Age: 63
End: 2023-08-21
Payer: COMMERCIAL

## 2023-08-21 VITALS — WEIGHT: 139.4 LBS | BODY MASS INDEX: 20.59 KG/M2

## 2023-08-21 DIAGNOSIS — R68.89 MULTIPLE SOMATIC COMPLAINTS: ICD-10-CM

## 2023-08-21 DIAGNOSIS — E78.2 MIXED HYPERLIPIDEMIA: ICD-10-CM

## 2023-08-21 PROCEDURE — 97802 MEDICAL NUTRITION INDIV IN: CPT | Performed by: DIETITIAN, REGISTERED

## 2023-08-22 NOTE — PROGRESS NOTES
Nutrition Assessment Form    Patient Name: Chad Lugo    YOB: 1960    Sex: Male     Assessment Date: 8/21/2023  Start Time: 2:17 Stop Time: 3:17 Total Minutes: 60     Data:  Present at session: self   Parent/Patient Concerns/reason for visit: PT is concerned with his diet as he feels he is eating too many oxaltes but if he changes his cholesterol will increase   Medical Dx/Reason for Referral: E78.2 Mixed Hyperlipidemia, R68.89 Multiple somatic complaints   No past medical history on file. Current Outpatient Medications   Medication Sig Dispense Refill   • Cholecalciferol (VITAMIN D) 2000 units tablet Take by mouth      • patient supplied medication daily at bedtime NATURES TRUTH ESSENTIAL OIL (Patient not taking: Reported on 7/24/2023)     • Zinc 25 MG TABS Take 25 tablets by mouth if needed       No current facility-administered medications for this visit. Additional Meds/Supplements:    Special Learning Needs/barriers to learning/any new barriers    Height: HC Readings from Last 5 Encounters:   No data found for [facility].      Weight: Wt Readings from Last 10 Encounters:   08/21/23 63.2 kg (139 lb 6.4 oz)   07/31/23 64 kg (141 lb)   07/24/23 65.3 kg (144 lb)   11/15/22 62.1 kg (137 lb)   07/01/21 66.2 kg (146 lb)   10/27/20 62.6 kg (138 lb)   06/30/20 64 kg (141 lb)   03/11/20 66.2 kg (146 lb)   10/28/19 64.9 kg (143 lb)   10/22/19 64.4 kg (142 lb)     Estimated body mass index is 20.59 kg/m² as calculated from the following:    Height as of 7/31/23: 5' 9" (1.753 m). Weight as of this encounter: 63.2 kg (139 lb 6.4 oz). Recent Weight Change: []Yes     [x]No  Amount:       Energy Needs: No calculation needed   No Known Allergies or intolerances    Social History     Substance and Sexual Activity   Alcohol Use Never       Social History     Tobacco Use   Smoking Status Never   Smokeless Tobacco Never       Who shops?  patient   Who cooks/cooking methods/Eating out/take out habits patient     Exercise: None right now     Other: ie: Sleep habits/ stress level/ work habits household-lives with ?/ food security Has a hard time staying asleep and has had nights where he only gets 2-3 hours of sleep   Prior Nutritional Counseling? []Yes     [x]No  When:      Why:         Diet Hx:  Breakfast: 11AM Diet: 7 squares shredded wheat bowl of Cheerios w/ 1 tsp pumpkin seed, sunflower seen and flax seed, 15 - 20 blueberries, 1/2 banana, 1/4 apple, 1 dried prune, 1 cup 2% milk, 1 cup cantaloupe, 1/2 cup pineapple   Lunch: 3PM 1/2 cup cauliflower, broccoli, onion, oregano, mushrooms and cheese     Dinner: 6PM Ham, Rice, 1 Tbsp olive oil, 1 cup [place] squash, 1 cup of cauliflower with cheese, onion and mushrooms, 1/2 cup of green tea and black tea mixed with 1/2 cup apple juice, 1 shot of cranberry juice, 1/2 avocado     Snacks: Drinks a lot of water   Other Notes/ Initial Assessment:  PT has read a book about "Toxic Super foods" and is concerned that the excessive fruits and vegetables that he has been eating has caused him to have excess oxalate. He feels this oxalate is causing him to be tired and sluggish. Discussed how the build up of oxalates would probably manifest itself as kidney stones. He is concerned that the foods that are low in oxalates are high in cholesterol. He has had blood work done and the only elevated level was cholesterol and this was 212. He states that he was 230 last year. Discussed may be using moderation of the fruits and vegetables. Suggested he use an elimination diet to determine if this is the cause of his tiredness. Did discuss other hormones that are released/supressed with stress and lack of sleep. PT admitted that he has had issues with sleep which was pointed out may be the cause of his tiredness rather than the oxalates. Did go over cholesterol and foods that he could eat which may lower cholesterol (viscous fiber) and avoiding animal products.       Updated assessment (Follow up note only):           Nutrition Diagnosis:   Food and nutrition related knowledge deficit  related to Lack of prior exposure to accurate nutrition related information as evidenced by Verbalizes inaccurate or incomplete information       Any change or new dx since previous visit:     Nutrition Diagnosis:         Medical Nutrition Therapy Intervention:  []Individualized Meal Plan []Understanding Lab Values   []Basic Pathophysiology of Disease []Food/Medication Interactions   [x]Food Diary: Try an elimination diet to see if certain foods in diet could be causing physical issues []Exercise   [x]Lifestyle/Behavior Modification Techniques: Limit cholesterol to 200 mg/day []Medication, Mechanism of Action   []Label Reading: CHO/ Na/ Fat/ other_________ []Self Blood Glucose Monitoring   []Weight/BMI Goals: gain/lose/maintain []Other -           Comprehension: []Excellent  []Very Good  [x]Good  []Fair   []Poor    Receptivity: []Excellent  []Very Good  [x]Good  []Fair   []Poor    Expected Compliance: []Excellent  []Very Good  [x]Good  []Fair   []Poor        Goals (initial)/ Progress made on previous goals/new goals:  1. Limit daily cholesterol intake to 200 mg/day   2. Try an elimination diet to see if there is food causing physical issues   3. No follow-ups on file.   Labs:  CMP  Lab Results   Component Value Date     11/29/2015    K 4.4 07/25/2023     07/25/2023    CO2 25 07/25/2023    ANIONGAP 10.3 11/29/2015    BUN 9 07/25/2023    CREATININE 0.94 07/25/2023    GLUCOSE 108 11/29/2015    CALCIUM 9.4 11/29/2015    AST 18 07/25/2023    ALT 12 07/25/2023    ALKPHOS 82 11/29/2015    PROT 8.6 (H) 11/29/2015    BILITOT 0.3 11/29/2015    EGFR 92 07/25/2023       BMP  Lab Results   Component Value Date    GLUCOSE 108 11/29/2015    CALCIUM 9.4 11/29/2015     11/29/2015    K 4.4 07/25/2023    CO2 25 07/25/2023     07/25/2023    BUN 9 07/25/2023    CREATININE 0.94 07/25/2023 Lipids  No results found for: "CHOL"  Lab Results   Component Value Date    HDL 58 07/25/2023    HDL 72 10/24/2022    HDL 63 07/01/2021     Lab Results   Component Value Date    LDLCALC 135 (H) 07/25/2023    LDLCALC 141 (H) 10/24/2022    LDLCALC 156 (H) 07/01/2021     Lab Results   Component Value Date    TRIG 106 07/25/2023    TRIG 86 10/24/2022    TRIG 108 07/01/2021     Lab Results   Component Value Date    CHOLHDL 3.7 07/25/2023    CHOLHDL 3.2 10/24/2022    CHOLHDL 3.8 07/01/2021       Hemoglobin A1C  Lab Results   Component Value Date    HGBA1C 5.2 07/25/2023       Fasting Glucose  No results found for: "GLUF"    Insulin     Thyroid  Lab Results   Component Value Date    TSH 1.740 07/25/2023       Hepatic Function Panel  Lab Results   Component Value Date    ALT 12 07/25/2023    AST 18 07/25/2023    ALKPHOS 82 11/29/2015    BILITOT 0.3 11/29/2015       Celiac Disease Antibody Panel  No results found for: "ENDOMYSIAL IGA", "GLIADIN IGA", "GLIADIN IGG", "IGA", "TISSUE TRANSGLUT AB", "TTG IGA"   Iron  Lab Results   Component Value Date    IRON 69 07/25/2023            James Gabriel, [address]  [address]  [address] [facility] [address]

## 2023-11-14 ENCOUNTER — TELEPHONE (OUTPATIENT)
Dept: FAMILY MEDICINE CLINIC | Facility: CLINIC | Age: 63
End: 2023-11-14

## 2023-11-14 NOTE — TELEPHONE ENCOUNTER
----- Message from Simran Calderon, [address] [facility] sent at 11/14/2023  8:00 AM EST -----  Regarding: labs  Pt has upcoming appt for physical on 11/16. Needs to have labs done prior to appt. Orders in chart. Please call pt to advise. If labs are not done, appt will need to be re-scheduled. TY  Tried calling patient.  No answer, could not leave a message

## 2024-01-25 LAB
ALBUMIN SERPL-MCNC: 4.5 G/DL (ref 3.9–4.9)
ALBUMIN/GLOB SERPL: 1.7 {RATIO} (ref 1.2–2.2)
ALP SERPL-CCNC: 84 IU/L (ref 44–121)
ALT SERPL-CCNC: 16 IU/L (ref 0–44)
AST SERPL-CCNC: 18 IU/L (ref 0–40)
BILIRUB SERPL-MCNC: 0.4 MG/DL (ref 0–1.2)
BUN SERPL-MCNC: 14 MG/DL (ref 8–27)
BUN/CREAT SERPL: 16 (ref 10–24)
CALCIUM SERPL-MCNC: 9.6 MG/DL (ref 8.6–10.2)
CHLORIDE SERPL-SCNC: 100 MMOL/L (ref 96–106)
CHOLEST SERPL-MCNC: 225 MG/DL (ref 100–199)
CHOLEST/HDLC SERPL: 3.4 RATIO (ref 0–5)
CO2 SERPL-SCNC: 26 MMOL/L (ref 20–29)
CREAT SERPL-MCNC: 0.88 MG/DL (ref 0.76–1.27)
EGFR: 98 ML/MIN/1.73
GLOBULIN SER-MCNC: 2.7 G/DL (ref 1.5–4.5)
GLUCOSE SERPL-MCNC: 95 MG/DL (ref 70–99)
HDLC SERPL-MCNC: 66 MG/DL
LDLC SERPL CALC-MCNC: 142 MG/DL (ref 0–99)
POTASSIUM SERPL-SCNC: 4.5 MMOL/L (ref 3.5–5.2)
PROT SERPL-MCNC: 7.2 G/DL (ref 6–8.5)
SL AMB VLDL CHOLESTEROL CALC: 17 MG/DL (ref 5–40)
SODIUM SERPL-SCNC: 140 MMOL/L (ref 134–144)
TRIGL SERPL-MCNC: 95 MG/DL (ref 0–149)

## 2024-01-29 ENCOUNTER — OFFICE VISIT (OUTPATIENT)
Dept: FAMILY MEDICINE CLINIC | Facility: CLINIC | Age: 64
End: 2024-01-29
Payer: COMMERCIAL

## 2024-01-29 VITALS
DIASTOLIC BLOOD PRESSURE: 80 MMHG | SYSTOLIC BLOOD PRESSURE: 120 MMHG | WEIGHT: 137 LBS | HEIGHT: 68 IN | TEMPERATURE: 97 F | BODY MASS INDEX: 20.76 KG/M2 | HEART RATE: 59 BPM | RESPIRATION RATE: 16 BRPM | OXYGEN SATURATION: 99 %

## 2024-01-29 DIAGNOSIS — Z12.5 SCREENING FOR MALIGNANT NEOPLASM OF PROSTATE: ICD-10-CM

## 2024-01-29 DIAGNOSIS — Z12.11 SCREENING FOR MALIGNANT NEOPLASM OF COLON: ICD-10-CM

## 2024-01-29 DIAGNOSIS — Z00.00 ANNUAL PHYSICAL EXAM: Primary | ICD-10-CM

## 2024-01-29 PROCEDURE — 99396 PREV VISIT EST AGE 40-64: CPT | Performed by: NURSE PRACTITIONER

## 2024-01-29 NOTE — PATIENT INSTRUCTIONS
Heart healthy, carbohydrate controlled diet- limit red meat, limit saturated fat, moderate salt intake, limit junk food, etc.   Regular exercise  Stress management  Routine labwork and screenings as ordered.   Cologuard has been ordered for colon cancer screening  PSA has been ordered and when results are available, we will notify you

## 2024-01-29 NOTE — PROGRESS NOTES
[facility] HEALTH MAINTENANCE OFFICE VISIT  [facility] PR[facility]    NAME: Maria G Cornejo  AGE: 63 y.o. SEX: male  : 1960     DATE: 2024    Assessment and Plan     1. Annual physical exam  Heart healthy, carbohydrate controlled diet- limit red meat, limit saturated fat, moderate salt intake, limit junk food, etc.   Regular exercise  Stress management  Routine labwork and screenings as ordered.       2. Screening for malignant neoplasm of colon  - Cologuard    3. Screening for malignant neoplasm of prostate  - PSA, total and free; Future        Patient Counseling:   Nutrition: Stressed importance of a well balanced diet, moderation of sodium/saturated fat, caloric balance and sufficient intake of fiber  Exercise: Stressed the importance of regular exercise with a goal of 150 minutes per week  Dental Health: Discussed daily flossing and brushing and regular dental visits     Immunizations reviewed: Risks and Benefits discussed  Discussed benefits of:  Colon Cancer Screening, Prostate Cancer Screening , and Screening labs.  BMI Counseling: Body mass index is 20.83 kg/m². Discussed with patient's BMI with him. BMI is wnl. Counseled on well balanced diet and regular exericse.               Chief Complaint     Chief Complaint   Patient presents with    Physical Exam     Lab review, cologuard order       History of Present Illness     Here for annual exam and lab review  Only taking multivitamin and vitamin D supplement  No recent illness  No specific concerns issues except notices that urine stream a little weak at times and wakes up at night to urinate. No pain or burning.        Well Adult Physical   Patient here for a comprehensive physical exam.      Diet and Physical Activity  Diet: well balanced diet  Exercise: intermittently      Depression Screen  PHQ-2/9 Depression Screening    Little interest or pleasure in doing things: 0 - not at all  Feeling down,  depressed, or hopeless: 0 - not at all  PHQ-2 Score: 0  PHQ-2 Interpretation: Negative depression screen     Depression Screening Follow-up Plan: Patient's depression screening was negative with a PHQ-2 score of 0. Clinically patient does not have depression. No treatment is required.       General Health  Hearing: Normal:  bilateral  Vision: no vision problems and most recent eye exam >1 year  Dental: no dental visits for >1 year        The following portions of the patient's history were reviewed and updated as appropriate: allergies, current medications, past family history, past medical history, past social history, past surgical history and problem list.    Review of Systems     Review of Systems   Constitutional:  Negative for chills, diaphoresis, fatigue and fever.   HENT:  Negative for congestion, sinus pressure, sinus pain and sore throat.    Eyes:  Negative for visual disturbance.   Respiratory:  Negative for cough, chest tightness, shortness of breath and wheezing.    Cardiovascular:  Negative for chest pain, palpitations and leg swelling.   Gastrointestinal:  Negative for abdominal distention, abdominal pain, diarrhea and nausea.   Genitourinary:  Negative for dysuria, frequency and urgency.        Urine stream weaker than used to be  Wakes up usually twice a night to urinate   Musculoskeletal:  Negative for arthralgias, back pain and neck pain.   Skin:  Negative for rash.   Allergic/Immunologic: Negative for immunocompromised state.   Neurological:  Negative for dizziness, weakness and headaches.   Hematological:  Negative for adenopathy.   Psychiatric/Behavioral:  Negative for dysphoric mood. The patient is not nervous/anxious.        Past Medical History     History reviewed. No pertinent past medical history.    Past Surgical History     Past Surgical History:   Procedure Laterality Date    NO PAST SURGERIES         Social History     Social History     Socioeconomic History    Marital status: Single      Spouse name: None    Number of children: None    Years of education: None    Highest education level: None   Occupational History    None   Tobacco Use    Smoking status: Never    Smokeless tobacco: Never   Vaping Use    Vaping status: Never Used   Substance and Sexual Activity    Alcohol use: Never    Drug use: Never    Sexual activity: None   Other Topics Concern    None   Social History Narrative    None     Social Determinants of Health     Financial Resource Strain: Not on file   Food Insecurity: Not on file   Transportation Needs: Not on file   Physical Activity: Not on file   Stress: Not on file   Social Connections: Not on file   Intimate Partner Violence: Not on file   Housing Stability: Not on file       Family History     Family History   Problem Relation Age of Onset    Diabetes type II Father         syage 2 chronic kidney disease    Heart attack Brother     Cancer Maternal Grandmother     Substance Abuse Neg Hx     Mental illness Neg Hx        Current Medications       Current Outpatient Medications:     Cholecalciferol (VITAMIN D) 2000 units tablet, Take by mouth , Disp: , Rfl:     patient supplied medication, UrinoZinc Prostate, Disp: , Rfl:     Zinc 25 MG TABS, Take 25 tablets by mouth if needed, Disp: , Rfl:     patient supplied medication, daily at bedtime NATURES TRUTH ESSENTIAL OIL (Patient not taking: Reported on 7/24/2023), Disp: , Rfl:      Allergies     No Known Allergies    Objective     Recent Results (from the past 672 hour(s))   Comprehensive metabolic panel    Collection Time: 01/24/24  9:52 AM   Result Value Ref Range    Glucose, Random 95 70 - 99 mg/dL    BUN 14 8 - 27 mg/dL    Creatinine 0.88 0.76 - 1.27 mg/dL    eGFR 98 >59 mL/min/1.73    SL AMB BUN/CREATININE RATIO 16 10 - 24    Sodium 140 134 - 144 mmol/L    Potassium 4.5 3.5 - 5.2 mmol/L    Chloride 100 96 - 106 mmol/L    CO2 26 20 - 29 mmol/L    CALCIUM 9.6 8.6 - 10.2 mg/dL    Protein, Total 7.2 6.0 - 8.5 g/dL    Albumin 4.5  "3.9 - 4.9 g/dL    Globulin, Total 2.7 1.5 - 4.5 g/dL    Albumin/Globulin Ratio 1.7 1.2 - 2.2    TOTAL BILIRUBIN 0.4 0.0 - 1.2 mg/dL    Alk Phos Isoenzymes 84 44 - 121 IU/L    AST 18 0 - 40 IU/L    ALT 16 0 - 44 IU/L   Lipid panel    Collection Time: 01/24/24  9:52 AM   Result Value Ref Range    Cholesterol, Total 225 (H) 100 - 199 mg/dL    Triglycerides 95 0 - 149 mg/dL    HDL 66 >39 mg/dL    VLDL Cholesterol Calculated 17 5 - 40 mg/dL    LDL Calculated 142 (H) 0 - 99 mg/dL    T. Chol/HDL Ratio 3.4 0.0 - 5.0 ratio     Reviewed lab/diagnostic results with pt including both normal and abnormal findings.   In depth counseling and instructions given. All questions answered during visit.     /80 (BP Location: Right arm, Patient Position: Sitting, Cuff Size: Standard)   Pulse 59   Temp (!) 97 °F (36.1 °C)   Resp 16   Ht 5' 8\" (1.727 m)   Wt 62.1 kg (137 lb)   SpO2 99%   BMI 20.83 kg/m²      Physical Exam  Constitutional:       General: He is not in acute distress.     Appearance: Normal appearance. He is well-developed. He is not ill-appearing.   HENT:      Head: Normocephalic and atraumatic.      Right Ear: Tympanic membrane and ear canal normal.      Left Ear: Tympanic membrane and ear canal normal.      Nose: Nose normal.      Mouth/Throat:      Mouth: Mucous membranes are moist.      Pharynx: Oropharynx is clear.   Eyes:      General: No scleral icterus.     Extraocular Movements: Extraocular movements intact.      Pupils: Pupils are equal, round, and reactive to light.   Neck:      Thyroid: No thyromegaly.      Vascular: No carotid bruit.   Cardiovascular:      Rate and Rhythm: Normal rate and regular rhythm.      Heart sounds: No murmur heard.  Pulmonary:      Effort: Pulmonary effort is normal. No respiratory distress.      Breath sounds: Normal breath sounds. No wheezing or rales.   Abdominal:      General: Bowel sounds are normal. There is no distension.      Palpations: Abdomen is soft.      " Tenderness: There is no abdominal tenderness.   Musculoskeletal:         General: Normal range of motion.      Cervical back: Normal range of motion and neck supple.      Right lower leg: No edema.      Left lower leg: No edema.   Lymphadenopathy:      Cervical: No cervical adenopathy.   Skin:     General: Skin is warm and dry.      Coloration: Skin is not jaundiced or pale.   Neurological:      General: No focal deficit present.      Mental Status: He is alert and oriented to person, place, and time.      Cranial Nerves: No cranial nerve deficit.      Sensory: No sensory deficit.   Psychiatric:         Mood and Affect: Mood normal.         Behavior: Behavior normal.         Thought Content: Thought content normal.         Judgment: Judgment normal.           Vision Screening    Right eye Left eye Both eyes   Without correction 20/25 20/25 20/25   With correction              AKRIE Parker  [facility]

## 2024-01-30 ENCOUNTER — TELEPHONE (OUTPATIENT)
Dept: ADMINISTRATIVE | Facility: OTHER | Age: 64
End: 2024-01-30

## 2024-01-30 NOTE — TELEPHONE ENCOUNTER
----- Message from Masha Barnes sent at 1/29/2024 12:54 PM EST -----  Regarding: COVID and flu vaccine  01/29/24 12:54 PM    Hello, our patient attached above has had Immunization(s) completed/performed. Please assist in updating the patient chart by making an External outreach to [facility] facility located in [place]. The date of service is [id number].    Thank you,  Masha ATKINSON

## 2024-01-30 NOTE — TELEPHONE ENCOUNTER
Upon review of the In Basket request and the patient's chart, initial outreach has been made via fax to facility. Please see Contacts section for details.     Thank you  Jewel Mendiola

## 2024-01-30 NOTE — LETTER
Vaccination Request Form: COVID-19 aka SARS-CoV-2 (Moderna or Pfizer or J & J)      Date Requested: 24  Patient: Maria G Cornejo  Patient : 1960   Referring Provider: KARIE Parekr       The above patient has informed us that they have had their   most recent COVID-19 aka SARS-CoV-2 (Moderna or Pfizer or J & J) administered at your facility. Please   complete this form and attach all corresponding documentation.    Date of Vaccine(s) Given  ______________________________    Lot Number(s) _______________________________________    Manufacture(s) ______________________________________    Dose Amount (s) _____________________________________    Expiration Date(s) ____________________________________    Comments __________________________________________________________  ____________________________________________________________________  ____________________________________________________________________  ____________________________________________________________________    Administering Facility  ________________________________________________    Vaccine Administered By (print name) ___________________________________      Form Completed By (print name) _______________________________________      Signature ___________________________________________________________      These reports are needed for  compliance.    Please fax this completed form and a copy of the Vaccine Document(s) to our office located at [address] [place] [address] as soon as possible to Fax [phone] attention Jewel: Phone [phone]    We thank you for your assistance in treating our mutual patient.

## 2024-01-30 NOTE — LETTER
Vaccination Request Form: Influenza      Date Requested: 24  Patient: Maria G Cornejo  Patient : 1960   Referring Provider: KARIE Parker       The above patient has informed us that they have had their   most recent Influenza administered at your facility. Please   complete this form and attach all corresponding documentation.    Date of Vaccine(s) Given  ______________________________    Lot Number(s) _______________________________________    Manufacture(s) ______________________________________    Dose Amount (s) _____________________________________    Expiration Date(s) ____________________________________    Comments __________________________________________________________  ____________________________________________________________________  ____________________________________________________________________  ____________________________________________________________________    Administering Facility  ________________________________________________    Vaccine Administered By (print name) ___________________________________      Form Completed By (print name) _______________________________________      Signature ___________________________________________________________      These reports are needed for  compliance.    Please fax this completed form and a copy of the Vaccine Document(s) to our office located at [address] [place] [address] as soon as possible to Fax [phone] attention Jewel: Phone [phone]    We thank you for your assistance in treating our mutual patient.

## 2024-02-07 NOTE — TELEPHONE ENCOUNTER
As a follow-up, a second attempt has been made for outreach via fax to facility. Please see Contacts section for details.    Thank you  Jewel Mendiola

## 2024-02-08 LAB
PSA FREE MFR SERPL: 30.8 %
PSA FREE SERPL-MCNC: 0.4 NG/ML
PSA SERPL-MCNC: 1.3 NG/ML (ref 0–4)

## 2024-02-10 LAB — COLOGUARD RESULT REPORTABLE: NORMAL

## 2024-02-20 NOTE — TELEPHONE ENCOUNTER
Upon review of the In Basket request we were able to locate, review, and update the patient chart as requested for Immunization(s) Covid and Flu.    Any additional questions or concerns should be emailed to the Practice Liaisons via the appropriate education email address, please do not reply via In Basket.    Thank you  Jewel Mendiola

## 2024-03-06 LAB — COLOGUARD RESULT REPORTABLE: NEGATIVE

## 2025-01-23 ENCOUNTER — TELEPHONE (OUTPATIENT)
Dept: FAMILY MEDICINE CLINIC | Facility: CLINIC | Age: 65
End: 2025-01-23

## 2025-01-23 DIAGNOSIS — E78.2 MIXED HYPERLIPIDEMIA: Primary | ICD-10-CM

## 2025-01-23 DIAGNOSIS — Z00.00 ANNUAL PHYSICAL EXAM: ICD-10-CM

## 2025-01-23 DIAGNOSIS — Z12.5 SCREENING FOR MALIGNANT NEOPLASM OF PROSTATE: ICD-10-CM

## 2025-01-23 DIAGNOSIS — E55.9 VITAMIN D DEFICIENCY: ICD-10-CM

## 2025-01-23 NOTE — TELEPHONE ENCOUNTER
Spoke to patient. He plans on stopping in tomorrow to get copy of lab orders and will get labs done prior to upcoming appt.

## 2025-01-23 NOTE — TELEPHONE ENCOUNTER
----- Message from KARIE Parker sent at 1/23/2025  1:21 PM EST -----  Regarding: labs  Pt has upcoming appt for physical on 1/30. Needs to have labs done prior to appt. Orders in chart.   Please call pt to advise.

## 2025-01-25 LAB
25(OH)D3+25(OH)D2 SERPL-MCNC: 44.9 NG/ML (ref 30–100)
ALBUMIN SERPL-MCNC: 4.6 G/DL (ref 3.9–4.9)
ALP SERPL-CCNC: 95 IU/L (ref 44–121)
ALT SERPL-CCNC: 18 IU/L (ref 0–44)
AST SERPL-CCNC: 19 IU/L (ref 0–40)
BASOPHILS # BLD AUTO: 0.1 X10E3/UL (ref 0–0.2)
BASOPHILS NFR BLD AUTO: 1 %
BILIRUB SERPL-MCNC: 0.4 MG/DL (ref 0–1.2)
BUN SERPL-MCNC: 13 MG/DL (ref 8–27)
BUN/CREAT SERPL: 13 (ref 10–24)
CALCIUM SERPL-MCNC: 9.7 MG/DL (ref 8.6–10.2)
CHLORIDE SERPL-SCNC: 98 MMOL/L (ref 96–106)
CHOLEST SERPL-MCNC: 253 MG/DL (ref 100–199)
CHOLEST/HDLC SERPL: 3.6 RATIO (ref 0–5)
CO2 SERPL-SCNC: 24 MMOL/L (ref 20–29)
CREAT SERPL-MCNC: 0.97 MG/DL (ref 0.76–1.27)
EGFR: 88 ML/MIN/1.73
EOSINOPHIL # BLD AUTO: 0.1 X10E3/UL (ref 0–0.4)
EOSINOPHIL NFR BLD AUTO: 2 %
ERYTHROCYTE [DISTWIDTH] IN BLOOD BY AUTOMATED COUNT: 11.9 % (ref 11.6–15.4)
GLOBULIN SER-MCNC: 3.1 G/DL (ref 1.5–4.5)
GLUCOSE SERPL-MCNC: 97 MG/DL (ref 70–99)
HCT VFR BLD AUTO: 47.6 % (ref 37.5–51)
HDLC SERPL-MCNC: 70 MG/DL
HGB BLD-MCNC: 15.8 G/DL (ref 13–17.7)
IMM GRANULOCYTES # BLD: 0.1 X10E3/UL (ref 0–0.1)
IMM GRANULOCYTES NFR BLD: 1 %
LDLC SERPL CALC-MCNC: 160 MG/DL (ref 0–99)
LYMPHOCYTES # BLD AUTO: 2 X10E3/UL (ref 0.7–3.1)
LYMPHOCYTES NFR BLD AUTO: 33 %
MCH RBC QN AUTO: 30 PG (ref 26.6–33)
MCHC RBC AUTO-ENTMCNC: 33.2 G/DL (ref 31.5–35.7)
MCV RBC AUTO: 91 FL (ref 79–97)
MONOCYTES # BLD AUTO: 0.6 X10E3/UL (ref 0.1–0.9)
MONOCYTES NFR BLD AUTO: 10 %
NEUTROPHILS # BLD AUTO: 3.2 X10E3/UL (ref 1.4–7)
NEUTROPHILS NFR BLD AUTO: 53 %
PLATELET # BLD AUTO: 243 X10E3/UL (ref 150–450)
POTASSIUM SERPL-SCNC: 4.5 MMOL/L (ref 3.5–5.2)
PROT SERPL-MCNC: 7.7 G/DL (ref 6–8.5)
PSA FREE MFR SERPL: 22.4 %
PSA FREE SERPL-MCNC: 0.47 NG/ML
PSA SERPL-MCNC: 2.1 NG/ML (ref 0–4)
RBC # BLD AUTO: 5.26 X10E6/UL (ref 4.14–5.8)
SL AMB VLDL CHOLESTEROL CALC: 23 MG/DL (ref 5–40)
SODIUM SERPL-SCNC: 144 MMOL/L (ref 134–144)
TRIGL SERPL-MCNC: 133 MG/DL (ref 0–149)
WBC # BLD AUTO: 6 X10E3/UL (ref 3.4–10.8)

## 2025-01-30 ENCOUNTER — OFFICE VISIT (OUTPATIENT)
Dept: FAMILY MEDICINE CLINIC | Facility: CLINIC | Age: 65
End: 2025-01-30
Payer: COMMERCIAL

## 2025-01-30 VITALS
HEIGHT: 67 IN | TEMPERATURE: 97.4 F | SYSTOLIC BLOOD PRESSURE: 122 MMHG | HEART RATE: 85 BPM | DIASTOLIC BLOOD PRESSURE: 82 MMHG | WEIGHT: 142 LBS | BODY MASS INDEX: 22.29 KG/M2 | RESPIRATION RATE: 18 BRPM

## 2025-01-30 DIAGNOSIS — Z83.49 FAMILY HISTORY OF HEMOCHROMATOSIS: ICD-10-CM

## 2025-01-30 DIAGNOSIS — Z00.00 ANNUAL PHYSICAL EXAM: Primary | ICD-10-CM

## 2025-01-30 PROCEDURE — 99396 PREV VISIT EST AGE 40-64: CPT | Performed by: NURSE PRACTITIONER

## 2025-01-30 NOTE — PATIENT INSTRUCTIONS
Heart healthy, carbohydrate controlled diet- limit red meat, limit saturated fat, moderate salt intake, limit junk food, etc.   Regular exercise  Stress management  Routine labwork and screenings as ordered.   Will check iron and ferritin levels

## 2025-01-30 NOTE — PROGRESS NOTES
"[facility] HEALTH MAINTENANCE OFFICE VISIT  [facility]    NAME: Maria G Cornejo  AGE: 64 y.o. SEX: male  : 1960     DATE: 2025    Assessment and Plan     1. Annual physical exam (Primary)  Heart healthy, carbohydrate controlled diet- limit red meat, limit saturated fat, moderate salt intake, limit junk food, etc.   Regular exercise  Stress management  Routine labwork and screenings as ordered.     2. Family history of hemochromatosis  - Iron  - Ferritin; Future  - Ferritin        Patient Counseling:   Nutrition: Stressed importance of a well balanced diet, moderation of sodium/saturated fat, caloric balance and sufficient intake of fiber  Exercise: Stressed the importance of regular exercise with a goal of 150 minutes per week  Dental Health: Discussed daily flossing and brushing and regular dental visits     Immunizations reviewed: Risks and Benefits discussed  Discussed benefits of:  Colon Cancer Screening, Prostate Cancer Screening , and Screening labs.  BMI Counseling: Body mass index is 22.55 kg/m². Discussed with patient's BMI with him. BMI is wnl. Counseled on well balanced diet and regular exericse.               Chief Complaint     Chief Complaint   Patient presents with    Physical Exam     Lab review        History of Present Illness     Here for annual exam and lab review  No recent illness  No prescription meds.   Feels tired all the time. Feels like his sleep is affected by things he eats.   Feel like is rested when wakes up.   Only sleeps about 4 hours. Wakes up during the night and then gets up a few times.   Started taking \"thiamine\" for energy but then noticed blood in \"saliva\" so stopped taking it.   Was thinking should go to \"sleep doctor\"  but does not want to do that yet.   Reports family history of hemachromatosis , father and brothers.  Would like iron and ferritin re-tested.             Well Adult Physical   Patient here " for a comprehensive physical exam.      Diet and Physical Activity  Diet: well balanced diet  Exercise: never      Depression Screen  PHQ-2/9 Depression Screening    Little interest or pleasure in doing things: 0 - not at all  Feeling down, depressed, or hopeless: 0 - not at all  PHQ-2 Score: 0  PHQ-2 Interpretation: Negative depression screen          General Health  Hearing: Normal:  bilateral  Vision: no vision problems and most recent eye exam <1 year; advised to follow up with eye doctor since visual acuity test showed impairment.   Dental: regular dental visits          The following portions of the patient's history were reviewed and updated as appropriate: allergies, current medications, past family history, past medical history, past social history, past surgical history and problem list.    Review of Systems     Review of Systems   Constitutional:  Positive for fatigue. Negative for chills, diaphoresis and fever.   HENT:  Negative for congestion, sinus pressure, sinus pain and sore throat.    Eyes:  Negative for visual disturbance.   Respiratory:  Negative for cough, chest tightness, shortness of breath and wheezing.    Cardiovascular:  Negative for chest pain, palpitations and leg swelling.   Gastrointestinal:  Negative for abdominal distention, abdominal pain, diarrhea and nausea.   Endocrine: Negative for polydipsia and polyuria.   Genitourinary:  Negative for dysuria, frequency and urgency.   Musculoskeletal:  Negative for arthralgias, back pain and neck pain.   Skin:  Negative for rash.   Allergic/Immunologic: Negative for immunocompromised state.   Neurological:  Negative for dizziness, weakness and headaches.   Hematological:  Negative for adenopathy.   Psychiatric/Behavioral:  Negative for dysphoric mood. The patient is not nervous/anxious.        Past Medical History     History reviewed. No pertinent past medical history.    Past Surgical History     Past Surgical History:   Procedure Laterality  Date    NO PAST SURGERIES         Social History     Social History     Socioeconomic History    Marital status: Single     Spouse name: None    Number of children: None    Years of education: None    Highest education level: None   Occupational History    None   Tobacco Use    Smoking status: Never     Passive exposure: Never    Smokeless tobacco: Never   Vaping Use    Vaping status: Never Used   Substance and Sexual Activity    Alcohol use: Never    Drug use: Never    Sexual activity: None   Other Topics Concern    None   Social History Narrative    None     Social Drivers of Health     Financial Resource Strain: Not on file   Food Insecurity: Not on file   Transportation Needs: Not on file   Physical Activity: Not on file   Stress: Not on file   Social Connections: Not on file   Intimate Partner Violence: Not on file   Housing Stability: Not on file       Family History     Family History   Problem Relation Age of Onset    Diabetes type II Father         syage 2 chronic kidney disease    Heart attack Brother     Cancer Maternal Grandmother     Substance Abuse Neg Hx     Mental illness Neg Hx        Current Medications       Current Outpatient Medications:     Cholecalciferol (VITAMIN D) 2000 units tablet, Take by mouth , Disp: , Rfl:     patient supplied medication, UrinoZinc Prostate, Disp: , Rfl:     Zinc 25 MG TABS, Take 25 tablets by mouth if needed, Disp: , Rfl:      Allergies     No Known Allergies    Objective     Recent Results (from the past 4 weeks)   CBC and differential    Collection Time: 01/24/25  9:53 AM   Result Value Ref Range    White Blood Cell Count 6.0 3.4 - 10.8 x10E3/uL    Red Blood Cell Count 5.26 4.14 - 5.80 x10E6/uL    Hemoglobin 15.8 13.0 - 17.7 g/dL    HCT 47.6 37.5 - 51.0 %    MCV 91 79 - 97 fL    MCH 30.0 26.6 - 33.0 pg    MCHC 33.2 31.5 - 35.7 g/dL    RDW 11.9 11.6 - 15.4 %    Platelet Count 243 150 - 450 x10E3/uL    Neutrophils 53 Not Estab. %    Lymphocytes 33 Not Estab. %     Monocytes 10 Not Estab. %    Eosinophils 2 Not Estab. %    Basophils PCT 1 Not Estab. %    Neutrophils (Absolute) 3.2 1.4 - 7.0 x10E3/uL    Lymphocytes (Absolute) 2.0 0.7 - 3.1 x10E3/uL    Monocytes (Absolute) 0.6 0.1 - 0.9 x10E3/uL    Eosinophils (Absolute) 0.1 0.0 - 0.4 x10E3/uL    Basophils ABS 0.1 0.0 - 0.2 x10E3/uL    Immature Granulocytes 1 Not Estab. %    Immature Granulocytes (Absolute) 0.1 0.0 - 0.1 x10E3/uL   Comprehensive metabolic panel    Collection Time: 01/24/25  9:53 AM   Result Value Ref Range    Glucose, Random 97 70 - 99 mg/dL    BUN 13 8 - 27 mg/dL    Creatinine 0.97 0.76 - 1.27 mg/dL    eGFR 88 >59 mL/min/1.73    SL AMB BUN/CREATININE RATIO 13 10 - 24    Sodium 144 134 - 144 mmol/L    Potassium 4.5 3.5 - 5.2 mmol/L    Chloride 98 96 - 106 mmol/L    CO2 24 20 - 29 mmol/L    CALCIUM 9.7 8.6 - 10.2 mg/dL    Protein, Total 7.7 6.0 - 8.5 g/dL    Albumin 4.6 3.9 - 4.9 g/dL    Globulin, Total 3.1 1.5 - 4.5 g/dL    TOTAL BILIRUBIN 0.4 0.0 - 1.2 mg/dL    Alk Phos Isoenzymes 95 44 - 121 IU/L    AST 19 0 - 40 IU/L    ALT 18 0 - 44 IU/L   Lipid panel    Collection Time: 01/24/25  9:53 AM   Result Value Ref Range    Cholesterol, Total 253 (H) 100 - 199 mg/dL    Triglycerides 133 0 - 149 mg/dL    HDL 70 >39 mg/dL    VLDL Cholesterol Calculated 23 5 - 40 mg/dL    LDL Calculated 160 (H) 0 - 99 mg/dL    T. Chol/HDL Ratio 3.6 0.0 - 5.0 ratio   Vitamin D 25 hydroxy    Collection Time: 01/24/25  9:53 AM   Result Value Ref Range    25-HYDROXY VIT D 44.9 30.0 - 100.0 ng/mL   PSA, total and free    Collection Time: 01/24/25  9:53 AM   Result Value Ref Range    Prostate Specific Antigen Total 2.1 0.0 - 4.0 ng/mL    PSA, Free 0.47 N/A ng/mL    PSA, Free Pct 22.4 %   Labs 1/2024  Chol 225, tg 95, hdl 66, ldl 142, ratio 3.4    Reviewed lab/diagnostic results with pt including both normal and abnormal findings.   In depth counseling and instructions given. All questions answered during visit.     /82   Pulse 85    "Temp (!) 97.4 °F (36.3 °C)   Resp 18   Ht 5' 6.54\" (1.69 m)   Wt 64.4 kg (142 lb)   BMI 22.55 kg/m²      Physical Exam  Vitals reviewed.   Constitutional:       General: He is not in acute distress.     Appearance: Normal appearance. He is well-developed and normal weight. He is not ill-appearing.   HENT:      Head: Normocephalic and atraumatic.      Right Ear: Tympanic membrane and ear canal normal.      Left Ear: Tympanic membrane and ear canal normal.      Nose: Nose normal.      Mouth/Throat:      Mouth: Mucous membranes are moist.      Pharynx: Oropharynx is clear.   Eyes:      General: No scleral icterus.     Extraocular Movements: Extraocular movements intact.      Pupils: Pupils are equal, round, and reactive to light.   Neck:      Thyroid: No thyromegaly.      Vascular: No carotid bruit.   Cardiovascular:      Rate and Rhythm: Normal rate and regular rhythm.      Heart sounds: No murmur heard.  Pulmonary:      Effort: Pulmonary effort is normal. No respiratory distress.      Breath sounds: Normal breath sounds. No wheezing or rales.   Abdominal:      General: Bowel sounds are normal. There is no distension.      Palpations: Abdomen is soft.      Tenderness: There is no abdominal tenderness.   Musculoskeletal:         General: Normal range of motion.      Cervical back: Normal range of motion and neck supple.      Right lower leg: No edema.      Left lower leg: No edema.   Lymphadenopathy:      Cervical: No cervical adenopathy.   Skin:     General: Skin is warm and dry.      Coloration: Skin is not jaundiced or pale.   Neurological:      General: No focal deficit present.      Mental Status: He is alert and oriented to person, place, and time.      Cranial Nerves: No cranial nerve deficit.      Sensory: No sensory deficit.      Coordination: Coordination normal.      Gait: Gait normal.   Psychiatric:         Mood and Affect: Mood normal.         Behavior: Behavior normal.         Thought Content: Thought " content normal.         Judgment: Judgment normal.           Vision Screening    Right eye Left eye Both eyes   Without correction 20/40 20/70 20/40   With correction              KARIE Parker  [facility] [place]

## 2025-02-14 ENCOUNTER — HOSPITAL ENCOUNTER (OUTPATIENT)
Dept: RADIOLOGY | Facility: HOSPITAL | Age: 65
Discharge: HOME | End: 2025-02-14
Payer: COMMERCIAL

## 2025-02-14 DIAGNOSIS — N20.0 URIC ACID NEPHROLITHIASIS: ICD-10-CM

## 2025-02-14 PROCEDURE — 74018 RADEX ABDOMEN 1 VIEW: CPT

## 2025-02-15 LAB
FERRITIN SERPL-MCNC: 159 NG/ML (ref 30–400)
IRON SERPL-MCNC: 130 UG/DL (ref 38–169)

## 2025-02-17 ENCOUNTER — RESULTS FOLLOW-UP (OUTPATIENT)
Dept: FAMILY MEDICINE CLINIC | Facility: CLINIC | Age: 65
End: 2025-02-17